# Patient Record
Sex: MALE | Race: WHITE | Employment: FULL TIME | URBAN - NONMETROPOLITAN AREA
[De-identification: names, ages, dates, MRNs, and addresses within clinical notes are randomized per-mention and may not be internally consistent; named-entity substitution may affect disease eponyms.]

---

## 2021-04-15 ENCOUNTER — OFFICE VISIT (OUTPATIENT)
Dept: FAMILY MEDICINE CLINIC | Age: 61
End: 2021-04-15
Payer: COMMERCIAL

## 2021-04-15 VITALS
HEART RATE: 65 BPM | SYSTOLIC BLOOD PRESSURE: 144 MMHG | DIASTOLIC BLOOD PRESSURE: 90 MMHG | WEIGHT: 181 LBS | OXYGEN SATURATION: 97 % | BODY MASS INDEX: 27.43 KG/M2 | HEIGHT: 68 IN

## 2021-04-15 DIAGNOSIS — Z76.89 ESTABLISHING CARE WITH NEW DOCTOR, ENCOUNTER FOR: Primary | ICD-10-CM

## 2021-04-15 DIAGNOSIS — I10 ESSENTIAL HYPERTENSION: ICD-10-CM

## 2021-04-15 DIAGNOSIS — R53.83 FATIGUE, UNSPECIFIED TYPE: ICD-10-CM

## 2021-04-15 DIAGNOSIS — N42.9 PROSTATE DISORDER: ICD-10-CM

## 2021-04-15 DIAGNOSIS — E78.5 HYPERLIPIDEMIA, UNSPECIFIED HYPERLIPIDEMIA TYPE: ICD-10-CM

## 2021-04-15 DIAGNOSIS — Z95.2 AORTIC VALVE REPLACED: ICD-10-CM

## 2021-04-15 DIAGNOSIS — L98.9 SKIN LESION: ICD-10-CM

## 2021-04-15 DIAGNOSIS — R01.1 MURMUR: ICD-10-CM

## 2021-04-15 PROCEDURE — 99204 OFFICE O/P NEW MOD 45 MIN: CPT | Performed by: NURSE PRACTITIONER

## 2021-04-15 RX ORDER — ALBUTEROL SULFATE 90 UG/1
2 AEROSOL, METERED RESPIRATORY (INHALATION) EVERY 6 HOURS PRN
COMMUNITY

## 2021-04-15 RX ORDER — LOSARTAN POTASSIUM 50 MG/1
1 TABLET ORAL 2 TIMES DAILY
COMMUNITY
Start: 2021-03-24 | End: 2022-05-17

## 2021-04-15 RX ORDER — METHYLPREDNISOLONE 4 MG/1
4 TABLET ORAL SEE ADMIN INSTRUCTIONS
COMMUNITY
End: 2021-07-14 | Stop reason: ALTCHOICE

## 2021-04-15 RX ORDER — EVOLOCUMAB 140 MG/ML
INJECTION, SOLUTION SUBCUTANEOUS
COMMUNITY

## 2021-04-15 ASSESSMENT — ENCOUNTER SYMPTOMS
BACK PAIN: 0
BLOOD IN STOOL: 0
ABDOMINAL PAIN: 0
COLOR CHANGE: 1
EYE ITCHING: 0
PHOTOPHOBIA: 0
EYE PAIN: 0
SHORTNESS OF BREATH: 0
COUGH: 0
VOMITING: 0
CONSTIPATION: 0
EYE DISCHARGE: 0
EYE REDNESS: 0
RHINORRHEA: 0
BLURRED VISION: 0
SINUS PAIN: 0
NAUSEA: 0
SORE THROAT: 0
TROUBLE SWALLOWING: 0
CHOKING: 0
DIARRHEA: 0
WHEEZING: 0
CHEST TIGHTNESS: 0
VOICE CHANGE: 0
STRIDOR: 0
SINUS PRESSURE: 0

## 2021-04-15 ASSESSMENT — PATIENT HEALTH QUESTIONNAIRE - PHQ9
SUM OF ALL RESPONSES TO PHQ QUESTIONS 1-9: 0
2. FEELING DOWN, DEPRESSED OR HOPELESS: 0

## 2021-04-15 NOTE — PROGRESS NOTES
Patient ID: Yolanda Mackey is a 63 y.o. male who presents today for a Physical Exam.    BP (!) 144/90   Pulse 65   Ht 5' 8\" (1.727 m)   Wt 181 lb (82.1 kg)   SpO2 97%   BMI 27.52 kg/m²     This patient is new to the practice and is here to establish care. The patients prior PCP was Alec Littlejohn NP. We reviewed the patients allergies and current medications. We reviewed the patients medical, surgical and family history. Hypertension  This is a chronic problem. The current episode started more than 1 year ago. The problem is uncontrolled. Pertinent negatives include no anxiety, blurred vision, chest pain, headaches, neck pain, palpitations or shortness of breath. There are no associated agents to hypertension. Risk factors for coronary artery disease include dyslipidemia and male gender. Past treatments include alpha 1 blockers. The current treatment provides moderate improvement. There are no compliance problems. We reviewed his christie that tracks his BP and it has been running 110-130 range. Last Echo was in October. He saw Dr. Michaela Elam in February. Advised to follow up with him. HLD: Cant take Statins. He has given up red meat and tries to control with diet. Will get labs in office to check. Asthma: He is on albuterol (Proair). He stopped Flovent and thought was contributing to HTN. No current issues at this time. Acute issues:    Rash: Got into something over the weekend and swelled up. He got prednisone shot and pack. This is helping and almost resolved. Muscle weakness: States he is still having muscle pain and weakness. Worse at night. He states he feels like he does not have the strength to get up. He likes to exercise but is having a hard time. This has been goind on awhile reintroduced chicken and fish to his diet to see fi this helps. Will get labs in near future due to him not fasting. He tried Mobic but felt terrible.     Prostate: States he has had prostate pain and urinary issues. He is having a hard time starting flow. When he has prostate issues he gets more fatigue. Advised to follow up with urology. Skin Lesion: He has a sore on his nose that wont heal. He also has a skin lesion on left eye that is changing in color and shape.  Will give Derm referral.     Specialists:     Cardiology: Dr. Michaela Elam    Urology: Dr. Antoinette Napoles: Dr. Raj Carson    Past Medical History:   Diagnosis Date    Aortic regurgitation     Aortic valve replaced     Asthma     Hyperlipidemia     Hypertension     Murmur        Past Surgical History:   Procedure Laterality Date    AORTIC VALVE REPLACEMENT  6/17/2014    CYSTOSCOPY      KIDNEY STONE REMOVAL      NASAL SEPTUM SURGERY      POLYPECTOMY      colon polyp    POLYPECTOMY  2011    colon polyp    SHOULDER SURGERY Right        Family History   Problem Relation Age of Onset    Cancer Mother         breast    Heart Failure Father        Social History     Socioeconomic History    Marital status:      Spouse name: None    Number of children: None    Years of education: None    Highest education level: None   Occupational History    None   Social Needs    Financial resource strain: None    Food insecurity     Worry: None     Inability: None    Transportation needs     Medical: None     Non-medical: None   Tobacco Use    Smoking status: Never Smoker    Smokeless tobacco: Never Used   Substance and Sexual Activity    Alcohol use: No    Drug use: No    Sexual activity: None   Lifestyle    Physical activity     Days per week: None     Minutes per session: None    Stress: None   Relationships    Social connections     Talks on phone: None     Gets together: None     Attends [employer] service: None     Active member of club or organization: None     Attends meetings of clubs or organizations: None     Relationship status: None    Intimate partner violence     Fear of current or ex partner: None     Emotionally abused: None Physically abused: None     Forced sexual activity: None   Other Topics Concern    None   Social History Narrative    None       Allergies   Allergen Reactions    Sulfa Antibiotics Anaphylaxis    Amlodipine Other (See Comments)     headache    Atorvastatin Other (See Comments)     Severe muscle weakness and muscular pain (when on in the past per pt 7/24/19)    Ciprofloxacin Other (See Comments)     torn ligament     Meloxicam Other (See Comments)     \" felt like he had the flu\"    Metoprolol Other (See Comments)     fatigue       Current Outpatient Medications   Medication Sig Dispense Refill    losartan (COZAAR) 50 MG tablet 1 tablet 2 times daily      Evolocumab (REPATHA) 140 MG/ML SOSY Inject into the skin every 14 days       albuterol sulfate HFA (PROAIR HFA) 108 (90 Base) MCG/ACT inhaler Inhale 2 puffs into the lungs every 6 hours as needed for Wheezing      methylPREDNISolone (MEDROL, OREN,) 4 MG tablet Take 4 mg by mouth See Admin Instructions Take by mouth.  montelukast (SINGULAIR) 10 MG tablet TAKE 1 TABLET BY MOUTH EVERY DAY 30 tablet 5    aspirin 81 MG chewable tablet Take 81 mg by mouth daily.  Multiple Vitamins-Minerals (MULTIVITAL-M) TABS Take 1 tablet by mouth daily       No current facility-administered medications for this visit. The patient's past medical history, past surgical history, family history, medications, and allergies were all reviewed and updated at appointment today. Review of Systems   Constitutional: Positive for fatigue. Negative for activity change, appetite change, chills, diaphoresis, fever and unexpected weight change. HENT: Negative for congestion, ear discharge, ear pain, hearing loss, nosebleeds, postnasal drip, rhinorrhea, sinus pressure, sinus pain, sneezing, sore throat, tinnitus, trouble swallowing and voice change. Eyes: Negative for blurred vision, photophobia, pain, discharge, redness and itching.    Respiratory: Negative for cough, choking, chest tightness, shortness of breath, wheezing and stridor. Cardiovascular: Negative for chest pain, palpitations and leg swelling. Gastrointestinal: Negative for abdominal pain, blood in stool, constipation, diarrhea, nausea and vomiting. Endocrine: Negative for cold intolerance, heat intolerance, polydipsia and polyuria. Genitourinary: Negative for difficulty urinating, dysuria, enuresis, flank pain, frequency, hematuria and urgency. Musculoskeletal: Negative for back pain, gait problem, joint swelling, neck pain and neck stiffness. Skin: Positive for color change. Negative for pallor, rash and wound. Allergic/Immunologic: Negative for environmental allergies and food allergies. Neurological: Negative for dizziness, tremors, syncope, speech difficulty, weakness, light-headedness, numbness and headaches. Hematological: Negative for adenopathy. Does not bruise/bleed easily. Psychiatric/Behavioral: Negative for agitation, behavioral problems, confusion, decreased concentration, dysphoric mood, hallucinations, self-injury, sleep disturbance and suicidal ideas. The patient is not nervous/anxious and is not hyperactive. Physical Exam  Vitals signs reviewed. Constitutional:       General: He is not in acute distress. Appearance: Normal appearance. He is well-developed. HENT:      Head: Normocephalic and atraumatic. Right Ear: Hearing and external ear normal.      Left Ear: Hearing and external ear normal.      Nose: Nose normal.      Right Sinus: No maxillary sinus tenderness or frontal sinus tenderness. Left Sinus: No maxillary sinus tenderness or frontal sinus tenderness. Mouth/Throat:      Pharynx: No oropharyngeal exudate. Eyes:      General:         Right eye: No discharge. Left eye: No discharge. Conjunctiva/sclera: Conjunctivae normal.      Pupils: Pupils are equal, round, and reactive to light.    Neck:      Musculoskeletal: Normal range of motion. Thyroid: No thyromegaly. Vascular: No JVD. Trachea: No tracheal deviation. Cardiovascular:      Rate and Rhythm: Normal rate and regular rhythm. Heart sounds: Murmur present. No friction rub. Pulmonary:      Effort: Pulmonary effort is normal. No respiratory distress. Breath sounds: Normal breath sounds. No stridor. No decreased breath sounds, wheezing, rhonchi or rales. Chest:      Chest wall: No tenderness. Abdominal:      Palpations: There is no mass. Musculoskeletal: Normal range of motion. General: No tenderness. Lymphadenopathy:      Cervical: No cervical adenopathy. Skin:     General: Skin is warm and dry. Capillary Refill: Capillary refill takes less than 2 seconds. Findings: Lesion present. No rash. Neurological:      Mental Status: He is alert and oriented to person, place, and time. Sensory: Sensation is intact. Motor: Motor function is intact. Coordination: Coordination normal.   Psychiatric:         Attention and Perception: Attention and perception normal.         Mood and Affect: Mood normal.         Speech: Speech normal.         Behavior: Behavior normal. Behavior is cooperative. Thought Content: Thought content normal.         Cognition and Memory: Cognition normal.         Judgment: Judgment normal.       Assessment:  Encounter Diagnoses   Name Primary?  Establishing care with new doctor, encounter for Yes    Hyperlipidemia, unspecified hyperlipidemia type     Essential hypertension     Fatigue, unspecified type     Murmur     Aortic valve replaced     Prostate disorder     Skin lesion        Plan:  1. Establishing care with new doctor, encounter for    2. Hyperlipidemia, unspecified hyperlipidemia type    - Lipid Panel; Future    3. Essential hypertension    - CBC Auto Differential; Future  - Comprehensive Metabolic Panel; Future  - Lipid Panel;  Future  - TSH without Reflex; Future  - T4, Free; Future  - Vitamin B12; Future  - Vitamin D 25 Hydroxy; Future    4. Fatigue, unspecified type    - CBC Auto Differential; Future  - Comprehensive Metabolic Panel; Future  - Lipid Panel; Future  - TSH without Reflex; Future  - T4, Free; Future  - Vitamin B12; Future  - Vitamin D 25 Hydroxy; Future    5. Murmur    - Advised to follow up with his cardiologist. Last Cardiology note states no murmur. 6. Aortic valve replaced    - Will follow up with cardiologist.     7. Prostate disorder    - PSA, Prostatic Specific Antigen; Future  -Will follow up with urology. 8. Skin lesion    - [facility] [address] [place] [address]    Will follow up with Dr. Jillian Watkins about murmur. Will follow up with lab results. Follow up if symptoms do not improve or worsen. If the patient becomes short of breath go straight to the ER or call 911. Return in about 3 months (around 7/15/2021).

## 2021-04-19 DIAGNOSIS — R73.09 ELEVATED GLUCOSE: Primary | ICD-10-CM

## 2021-04-23 ENCOUNTER — NURSE ONLY (OUTPATIENT)
Dept: FAMILY MEDICINE CLINIC | Age: 61
End: 2021-04-23
Payer: COMMERCIAL

## 2021-04-23 DIAGNOSIS — N42.9 PROSTATE DISORDER: ICD-10-CM

## 2021-04-23 DIAGNOSIS — R73.09 ELEVATED GLUCOSE: ICD-10-CM

## 2021-04-23 DIAGNOSIS — E78.5 HYPERLIPIDEMIA, UNSPECIFIED HYPERLIPIDEMIA TYPE: ICD-10-CM

## 2021-04-23 DIAGNOSIS — R53.83 FATIGUE, UNSPECIFIED TYPE: ICD-10-CM

## 2021-04-23 DIAGNOSIS — I10 ESSENTIAL HYPERTENSION: ICD-10-CM

## 2021-04-23 LAB
A/G RATIO: 1.7 (ref 1.1–2.2)
ALBUMIN SERPL-MCNC: 4.3 G/DL (ref 3.4–5)
ALP BLD-CCNC: 73 U/L (ref 40–129)
ALT SERPL-CCNC: 27 U/L (ref 10–40)
ANION GAP SERPL CALCULATED.3IONS-SCNC: 12 MMOL/L (ref 3–16)
AST SERPL-CCNC: 13 U/L (ref 15–37)
BASOPHILS ABSOLUTE: 0.1 K/UL (ref 0–0.2)
BASOPHILS RELATIVE PERCENT: 0.6 %
BILIRUB SERPL-MCNC: 0.8 MG/DL (ref 0–1)
BUN BLDV-MCNC: 14 MG/DL (ref 7–20)
CALCIUM SERPL-MCNC: 9 MG/DL (ref 8.3–10.6)
CHLORIDE BLD-SCNC: 101 MMOL/L (ref 99–110)
CHOLESTEROL, TOTAL: 107 MG/DL (ref 0–199)
CO2: 30 MMOL/L (ref 21–32)
CREAT SERPL-MCNC: 1 MG/DL (ref 0.8–1.3)
EOSINOPHILS ABSOLUTE: 0.1 K/UL (ref 0–0.6)
EOSINOPHILS RELATIVE PERCENT: 1.4 %
GFR AFRICAN AMERICAN: >60
GFR NON-AFRICAN AMERICAN: >60
GLOBULIN: 2.6 G/DL
GLUCOSE BLD-MCNC: 85 MG/DL (ref 70–99)
HCT VFR BLD CALC: 49.6 % (ref 40.5–52.5)
HDLC SERPL-MCNC: 41 MG/DL (ref 40–60)
HEMOGLOBIN: 17.2 G/DL (ref 13.5–17.5)
LDL CHOLESTEROL CALCULATED: ABNORMAL MG/DL
LDL CHOLESTEROL DIRECT: 28 MG/DL
LYMPHOCYTES ABSOLUTE: 3.1 K/UL (ref 1–5.1)
LYMPHOCYTES RELATIVE PERCENT: 32.2 %
MCH RBC QN AUTO: 32.4 PG (ref 26–34)
MCHC RBC AUTO-ENTMCNC: 34.6 G/DL (ref 31–36)
MCV RBC AUTO: 93.8 FL (ref 80–100)
MONOCYTES ABSOLUTE: 0.7 K/UL (ref 0–1.3)
MONOCYTES RELATIVE PERCENT: 7.4 %
NEUTROPHILS ABSOLUTE: 5.6 K/UL (ref 1.7–7.7)
NEUTROPHILS RELATIVE PERCENT: 58.4 %
PDW BLD-RTO: 12.8 % (ref 12.4–15.4)
PLATELET # BLD: 229 K/UL (ref 135–450)
PMV BLD AUTO: 8.5 FL (ref 5–10.5)
POTASSIUM SERPL-SCNC: 4.2 MMOL/L (ref 3.5–5.1)
PROSTATE SPECIFIC ANTIGEN: 1.52 NG/ML (ref 0–4)
RBC # BLD: 5.29 M/UL (ref 4.2–5.9)
SODIUM BLD-SCNC: 143 MMOL/L (ref 136–145)
T4 FREE: 1.3 NG/DL (ref 0.9–1.8)
TOTAL PROTEIN: 6.9 G/DL (ref 6.4–8.2)
TRIGL SERPL-MCNC: 316 MG/DL (ref 0–150)
TSH SERPL DL<=0.05 MIU/L-ACNC: 3.63 UIU/ML (ref 0.27–4.2)
VITAMIN B-12: 655 PG/ML (ref 211–911)
VITAMIN D 25-HYDROXY: 32.3 NG/ML
VLDLC SERPL CALC-MCNC: ABNORMAL MG/DL
WBC # BLD: 9.6 K/UL (ref 4–11)

## 2021-04-23 PROCEDURE — 36415 COLL VENOUS BLD VENIPUNCTURE: CPT | Performed by: NURSE PRACTITIONER

## 2021-04-23 NOTE — PROGRESS NOTES
Blood drawn per order. Needle size: 23 g  Site: L Antecubital.  First attempt successful Yes    Pressure applied until bleeding stopped. Band-aid applied. Patient informed to call office or return if bleeding reoccurs and unable to stop.     Tubes drawn: 1 purple     2 red

## 2021-04-24 LAB
ESTIMATED AVERAGE GLUCOSE: 111.2 MG/DL
HBA1C MFR BLD: 5.5 %

## 2021-04-26 DIAGNOSIS — R79.89 LOW VITAMIN D LEVEL: Primary | ICD-10-CM

## 2021-05-03 ENCOUNTER — PATIENT MESSAGE (OUTPATIENT)
Dept: FAMILY MEDICINE CLINIC | Age: 61
End: 2021-05-03

## 2021-05-03 RX ORDER — MONTELUKAST SODIUM 10 MG/1
TABLET ORAL
Qty: 30 TABLET | Refills: 11 | Status: SHIPPED | OUTPATIENT
Start: 2021-05-03 | End: 2022-05-17 | Stop reason: SDUPTHER

## 2021-07-14 ENCOUNTER — OFFICE VISIT (OUTPATIENT)
Dept: FAMILY MEDICINE CLINIC | Age: 61
End: 2021-07-14
Payer: COMMERCIAL

## 2021-07-14 VITALS
WEIGHT: 185 LBS | OXYGEN SATURATION: 98 % | BODY MASS INDEX: 28.13 KG/M2 | DIASTOLIC BLOOD PRESSURE: 78 MMHG | HEART RATE: 74 BPM | SYSTOLIC BLOOD PRESSURE: 132 MMHG

## 2021-07-14 DIAGNOSIS — J45.909 UNCOMPLICATED ASTHMA, UNSPECIFIED ASTHMA SEVERITY, UNSPECIFIED WHETHER PERSISTENT: ICD-10-CM

## 2021-07-14 DIAGNOSIS — I10 ESSENTIAL HYPERTENSION: ICD-10-CM

## 2021-07-14 DIAGNOSIS — E78.5 HYPERLIPIDEMIA, UNSPECIFIED HYPERLIPIDEMIA TYPE: Primary | ICD-10-CM

## 2021-07-14 DIAGNOSIS — R01.1 MURMUR: ICD-10-CM

## 2021-07-14 DIAGNOSIS — H91.93 DECREASED HEARING OF BOTH EARS: ICD-10-CM

## 2021-07-14 LAB
CHOLESTEROL, TOTAL: 126 MG/DL (ref 0–199)
HDLC SERPL-MCNC: 40 MG/DL (ref 40–60)
LDL CHOLESTEROL CALCULATED: 47 MG/DL
TRIGL SERPL-MCNC: 197 MG/DL (ref 0–150)
VLDLC SERPL CALC-MCNC: 39 MG/DL

## 2021-07-14 PROCEDURE — 99214 OFFICE O/P EST MOD 30 MIN: CPT | Performed by: NURSE PRACTITIONER

## 2021-07-14 RX ORDER — FLUTICASONE PROPIONATE 220 UG/1
AEROSOL, METERED RESPIRATORY (INHALATION)
COMMUNITY
End: 2021-07-14 | Stop reason: SDUPTHER

## 2021-07-14 RX ORDER — FLUTICASONE PROPIONATE 220 UG/1
AEROSOL, METERED RESPIRATORY (INHALATION)
Qty: 1 INHALER | Refills: 2 | Status: SHIPPED | OUTPATIENT
Start: 2021-07-14 | End: 2022-05-11

## 2021-07-14 ASSESSMENT — ENCOUNTER SYMPTOMS
COUGH: 0
COLOR CHANGE: 0
SINUS PRESSURE: 0
EYE PAIN: 0
VOMITING: 0
RHINORRHEA: 0
STRIDOR: 0
SORE THROAT: 0
WHEEZING: 0
VOICE CHANGE: 0
CHEST TIGHTNESS: 0
CHOKING: 0
BACK PAIN: 0
PHOTOPHOBIA: 0
EYE DISCHARGE: 0
SINUS PAIN: 0
CONSTIPATION: 0
TROUBLE SWALLOWING: 0
SHORTNESS OF BREATH: 0
BLOOD IN STOOL: 0
NAUSEA: 0
EYE REDNESS: 0
ABDOMINAL PAIN: 0
EYE ITCHING: 0
DIARRHEA: 0

## 2021-07-14 NOTE — PROGRESS NOTES
Chief Complaint   Patient presents with   [facility] Urinary Frequency       HPI:  Diana Corbett is a 63 y.o. (: 1960) here today   for   HPI    Patient's medications, allergies, past medical, surgical, social and family histories were reviewed and updated as appropriate. Chronic Issues:    HTN: He is on losartan 50 mg. Well controlled in office. Patient is doing well on current medical regimen and will continue current medical regimen at this time. HLD: He is trying to regulate with diet. Will get labs today in office. He tried red yeast rice but states he had muscle pain. Asthma: He is on albuterol. He needs refills today. Patient is doing well on current medical regimen and will continue current medical regimen at this time. Hearing decreased: He is having more difficulty hearing. Would like an Audiology referral.     Acute Issues:    Urinary issues:  He has had prostate problems for years. He has had 2 prostate surgeries in the past. He saw Dr. Bush. He has pain near his rectum/prostate area. He has had a hard time starting the flow of urine and then stops midstream. Flomax gives him cold symptoms per patient. He last saw urology was in January. Advised to call urology and follow up with him. Denies burning with urination. ROS:  Review of Systems   Constitutional: Negative for activity change, appetite change, chills, diaphoresis, fatigue, fever and unexpected weight change. HENT: Negative for congestion, ear discharge, ear pain, hearing loss, nosebleeds, postnasal drip, rhinorrhea, sinus pressure, sinus pain, sneezing, sore throat, tinnitus, trouble swallowing and voice change. Eyes: Negative for photophobia, pain, discharge, redness and itching. Respiratory: Negative for cough, choking, chest tightness, shortness of breath, wheezing and stridor. Cardiovascular: Negative for chest pain, palpitations and leg swelling.    Gastrointestinal: Negative for abdominal pain, blood in stool, constipation, diarrhea, nausea and vomiting. Endocrine: Negative for cold intolerance, heat intolerance, polydipsia and polyuria. Genitourinary: Positive for difficulty urinating. Negative for dysuria, enuresis, flank pain, frequency, hematuria and urgency. Difficulty starting flow of urine     Musculoskeletal: Negative for back pain, gait problem, joint swelling, neck pain and neck stiffness. Skin: Negative for color change, pallor, rash and wound. Allergic/Immunologic: Negative for environmental allergies and food allergies. Neurological: Negative for dizziness, tremors, syncope, speech difficulty, weakness, light-headedness, numbness and headaches. Hematological: Negative for adenopathy. Does not bruise/bleed easily. Psychiatric/Behavioral: Negative for agitation, behavioral problems, confusion, decreased concentration, dysphoric mood, hallucinations, self-injury, sleep disturbance and suicidal ideas. The patient is not nervous/anxious and is not hyperactive.         Hemoglobin A1C (%)   Date Value   04/23/2021 5.5     LDL Calculated (mg/dL)   Date Value   04/23/2021 see below       Past Medical History:   Diagnosis Date    Aortic regurgitation     Aortic valve replaced     Asthma     Hyperlipidemia     Hypertension     Murmur        Family History   Problem Relation Age of Onset    Cancer Mother         breast    Heart Failure Father        Social History     Socioeconomic History    Marital status:      Spouse name: Not on file    Number of children: Not on file    Years of education: Not on file    Highest education level: Not on file   Occupational History    Not on file   Tobacco Use    Smoking status: Never Smoker    Smokeless tobacco: Never Used   Substance and Sexual Activity    Alcohol use: No    Drug use: No    Sexual activity: Not on file   Other Topics Concern    Not on file   Social History Narrative    Not on file     Social Determinants of Health     Financial Resource Strain:     Difficulty of Paying Living Expenses:    Food Insecurity:     Worried About [address] in the Last Year:     [address] in the Last Year:    Transportation Needs:     Lack of Transportation (Medical):  Lack of Transportation (Non-Medical):    Physical Activity:     Days of Exercise per Week:     Minutes of Exercise per Session:    Stress:     Feeling of Stress :    Social Connections:     Frequency of Communication with Friends and Family:     Frequency of Social Gatherings with Friends and Family:     Attends [employer] Services:     Active Member of Clubs or Organizations:     Attends Club or Organization Meetings:     Marital Status:    Intimate Partner Violence:     Fear of Current or Ex-Partner:     Emotionally Abused:     Physically Abused:     Sexually Abused:        Prior to Visit Medications    Medication Sig Taking? Authorizing Provider   magnesium hydroxide (MILK OF MAGNESIA) 400 MG/5ML suspension Take by mouth daily as needed for Constipation Yes Historical Provider, MD   fluticasone (FLOVENT HFA) 220 MCG/ACT inhaler Flovent  mcg/actuation aerosol inhaler Yes LEIGHTON Robert CNP   montelukast (SINGULAIR) 10 MG tablet TAKE 1 TABLET BY MOUTH EVERY DAY Yes LEIGHTON Robert CNP   losartan (COZAAR) 50 MG tablet 1 tablet 2 times daily Yes Historical Provider, MD   Evolocumab (REPATHA) 140 MG/ML SOSY Inject into the skin every 14 days  Yes Historical Provider, MD   albuterol sulfate HFA (PROAIR HFA) 108 (90 Base) MCG/ACT inhaler Inhale 2 puffs into the lungs every 6 hours as needed for Wheezing Yes Historical Provider, MD   Multiple Vitamins-Minerals (MULTIVITAL-M) TABS Take 1 tablet by mouth daily Yes Historical Provider, MD   aspirin 81 MG chewable tablet Take 81 mg by mouth daily.  Yes Historical Provider, MD       Allergies   Allergen Reactions    Sulfa Antibiotics Anaphylaxis    Amlodipine Other (See Comments) headache    Atorvastatin Other (See Comments)     Severe muscle weakness and muscular pain (when on in the past per pt 7/24/19)    Ciprofloxacin Other (See Comments)     torn ligament     Meloxicam Other (See Comments)     \" felt like he had the flu\"    Metoprolol Other (See Comments)     fatigue       OBJECTIVE:    /78   Pulse 74   Wt 185 lb (83.9 kg)   SpO2 98%   BMI 28.13 kg/m²     BP Readings from Last 2 Encounters:   07/14/21 132/78   04/15/21 (!) 144/90       Wt Readings from Last 3 Encounters:   07/14/21 185 lb (83.9 kg)   04/15/21 181 lb (82.1 kg)   09/10/15 166 lb (75.3 kg)       Physical Exam  Vitals reviewed. Constitutional:       General: He is not in acute distress. Appearance: Normal appearance. He is well-developed. HENT:      Head: Normocephalic and atraumatic. Right Ear: Hearing and external ear normal.      Left Ear: Hearing and external ear normal.      Nose: Nose normal.      Right Sinus: No maxillary sinus tenderness or frontal sinus tenderness. Left Sinus: No maxillary sinus tenderness or frontal sinus tenderness. Mouth/Throat:      Pharynx: No oropharyngeal exudate. Eyes:      General:         Right eye: No discharge. Left eye: No discharge. Conjunctiva/sclera: Conjunctivae normal.      Pupils: Pupils are equal, round, and reactive to light. Neck:      Thyroid: No thyromegaly. Vascular: No JVD. Trachea: No tracheal deviation. Cardiovascular:      Rate and Rhythm: Normal rate and regular rhythm. Heart sounds: Murmur heard. No friction rub. Pulmonary:      Effort: Pulmonary effort is normal. No respiratory distress. Breath sounds: Normal breath sounds. No stridor. No decreased breath sounds, wheezing, rhonchi or rales. Musculoskeletal:         General: No tenderness. Normal range of motion. Cervical back: Normal range of motion. Lymphadenopathy:      Cervical: No cervical adenopathy.    Skin:     General: Skin is warm and dry. Capillary Refill: Capillary refill takes less than 2 seconds. Findings: No rash. Neurological:      Mental Status: He is alert and oriented to person, place, and time. Sensory: Sensation is intact. Motor: Motor function is intact. Coordination: Coordination normal.   Psychiatric:         Attention and Perception: Attention and perception normal.         Mood and Affect: Mood normal.         Speech: Speech normal.         Behavior: Behavior normal. Behavior is cooperative. Thought Content: Thought content normal.         Cognition and Memory: Cognition normal.         Judgment: Judgment normal.       ASSESSMENT/PLAN:    1. Hyperlipidemia, unspecified hyperlipidemia type    - Trying to control with diet, cant tolerate meds. - Lipid Panel    2. Essential hypertension    Patient is doing well on current medical regimen and will continue current medical regimen at this time. 3. Murmur    - Stable at this time. See's cardiology. 4. Uncomplicated asthma, unspecified asthma severity, unspecified whether persistent    - fluticasone (FLOVENT HFA) 220 MCG/ACT inhaler; Flovent  mcg/actuation aerosol inhaler  Dispense: 1 Inhaler; Refill: 2    5. Decreased hearing of both ears    - Sharita Vallecillo Au.D., [facility], Lupis Tavares    Will have him follow up in 6 months. Doing well on current medical regimen. Return in about 6 months (around 1/14/2022).

## 2021-08-17 NOTE — PROGRESS NOTES
Sheyla Hendrix   1960, 57 y.o. male   [id number]       Referring Provider: Vince Granados MD  Referral Type: In an order in [address] [place] [address]    Reason for Visit: Evaluation of suspected change in hearing and tinnitus    ADULT AUDIOLOGIC EVALUATION      Sheyla Hendrix is a 57 y.o. male seen today, 8/18/2021 , for an initial audiologic evaluation. Patient was seen by Vince Granados MD following today's evaluation. AUDIOLOGIC AND OTHER PERTINENT MEDICAL HISTORY:      Sheyla Hendrix noted a perceived gradual decline in hearing and long-standing history of tinnitus that has reportedly worsened recently, bilaterally. Patient also reports family history of hearing loss in father. No additional significant otologic or medical history was reported. Sheyla Hendrix denied otalgia, aural fullness, otorrhea, dizziness, imbalance, history of falls, history of occupational/recreationao noise exposure, history of head trauma and history of ear surgery. Date: 8/18/2021     IMPRESSIONS:      Today's results revealed a symmetric sensorineural hearing loss with excellent word recognition, bilaterally. Hearing loss significant enough to create hearing difficulty in at least some listening situations. Discussed benefits of amplification. Follow medical recommendations of Vince Granados MD. Patient is scheduled for hearing aid evaluation on 9/1/21. ASSESSMENT AND FINDINGS:     Otoscopy revealed: Clear ear canals bilaterally    RIGHT EAR:  Hearing Sensitivity: Within normal limits through 1000Hz sloping to a mild to severe sensorineural hearing loss through 8kHz. Speech Recognition Threshold: 25 dB HL  Word Recognition: Excellent (96%), based on NU-6 25-word list at 75 dBHL using recorded speech stimuli. Tympanometry: Normal peak pressure and compliance, Type A tympanogram, consistent with normal middle ear function.     LEFT EAR:  Hearing Sensitivity: Within normal limits through 500Hz sloping to a mild to severe sensorineural hearing loss through ALL [facility]. Speech Recognition Threshold: 25 dB HL  Word Recognition: Excellent (96%), based on NU-6 25-word list at 75 dBHL using recorded speech stimuli. Tympanometry: Normal peak pressure and compliance, Type A tympanogram, consistent with normal middle ear function. Reliability: Good  Transducer: Inserts    See scanned audiogram dated 8/18/2021  for results. PATIENT EDUCATION:       The following items were discussed with the patient:    - Good Communication Strategies   - Hearing Loss and Hearing Aids     - Tinnitus Management Strategies    Educational information was shared in the After Visit Summary. RECOMMENDATIONS:                                                                                                                                                                                                                                                            The following items are recommended based on patient report and results from today's appointment:   - Continue medical follow-up with Tracy Mendes MD.   - Retest hearing as medically indicated and/or sooner if a change in hearing is noted. - If desired, schedule a Hearing Aid Evaluation (HAE) appointment to discuss hearing aid options    - Utilize \"Good Communication Strategies\" as discussed to assist in speech understanding with communication partners. - Maintain a sound enriched environment to assist in the management of tinnitus symptoms.          Rocío Burris  Audiologist    Chart CC'd to: Tracy Mendes MD      Degree of   Hearing Sensitivity dB Range   Within Normal Limits (WNL) 0 - 20   Mild 20 - 40   Moderate 40 - 55   Moderately-Severe 55 - 70   Severe 70 - 90   Profound 90 +

## 2021-08-18 ENCOUNTER — PROCEDURE VISIT (OUTPATIENT)
Dept: AUDIOLOGY | Age: 61
End: 2021-08-18
Payer: COMMERCIAL

## 2021-08-18 ENCOUNTER — OFFICE VISIT (OUTPATIENT)
Dept: ENT CLINIC | Age: 61
End: 2021-08-18
Payer: COMMERCIAL

## 2021-08-18 VITALS
SYSTOLIC BLOOD PRESSURE: 137 MMHG | TEMPERATURE: 97.2 F | DIASTOLIC BLOOD PRESSURE: 84 MMHG | HEART RATE: 66 BPM | WEIGHT: 185 LBS | BODY MASS INDEX: 28.04 KG/M2 | HEIGHT: 68 IN

## 2021-08-18 DIAGNOSIS — H90.3 SENSORINEURAL HEARING LOSS, BILATERAL: Primary | ICD-10-CM

## 2021-08-18 DIAGNOSIS — H93.13 TINNITUS OF BOTH EARS: ICD-10-CM

## 2021-08-18 DIAGNOSIS — H93.13 TINNITUS, BILATERAL: ICD-10-CM

## 2021-08-18 DIAGNOSIS — R42 DIZZINESS: ICD-10-CM

## 2021-08-18 DIAGNOSIS — H91.90 HEARING LOSS, UNSPECIFIED HEARING LOSS TYPE, UNSPECIFIED LATERALITY: Primary | ICD-10-CM

## 2021-08-18 PROCEDURE — 99203 OFFICE O/P NEW LOW 30 MIN: CPT | Performed by: OTOLARYNGOLOGY

## 2021-08-18 PROCEDURE — 92557 COMPREHENSIVE HEARING TEST: CPT | Performed by: AUDIOLOGIST

## 2021-08-18 PROCEDURE — 92567 TYMPANOMETRY: CPT | Performed by: AUDIOLOGIST

## 2021-08-18 NOTE — PATIENT INSTRUCTIONS

## 2021-08-18 NOTE — Clinical Note
Dr. Mao Cruz,    Thank you for your referral for audiometric testing on this patient. Please see the scanned audiogram (under \"Media\" tab) and encounter note for details. If you have any questions, or if there is anything else you need, please let me know.       Rocío Self  Audiologist  ---  [address] [facility] - Audiology

## 2021-08-18 NOTE — PROGRESS NOTES
[address] [facility] SURGERY  NEW PATIENT HISTORY AND PHYSICAL NOTE      Patient Name: [address] [facility] Record Number:  [id number]  Primary Care Physician:  LEIGHTON Hutson CNP    ChiefComplaint     Chief Complaint   Patient presents with    Hearing Problem     Has also been going on for 8 years and has gotten worse.  Tinnitus     Been going on for about 8 ears and has gotten worse over time.  Dizziness     Does have occasional dizziness. Has gotten worse in the last year. Happens at least once every 2 weeks (about 12 times in the last year). Dizzy spells last one minute at the most.      History of Present Illness     Elizabeth Bello is an 57 y.o. male with concern for bilateral hearing loss, tinnitus (constant, high-pitched, nonpulsatile, left greater than sign right) that of been ongoing for the past 7-8 years. Denies any episodes of sudden hearing loss, otalgia, otorrhea, ear fullness, yulia vertigo. In addition to the above, he states occasional dizziness that occurs every 2-3 weeks, usually while stationary. During this time he feels tired, as if he is going to faint, with lightheadednessdenies yulia vertigo. Has history of cardiovascular disease, has discussed symptoms with his cardiologist is undergoing a stress test this coming Friday.     Past Medical History     Past Medical History:   Diagnosis Date    Aortic regurgitation     Aortic valve replaced     Asthma     Hyperlipidemia     Hypertension     Murmur      Past Surgical History     Past Surgical History:   Procedure Laterality Date    AORTIC VALVE REPLACEMENT  6/17/2014    CYSTOSCOPY      KIDNEY STONE REMOVAL      NASAL SEPTUM SURGERY      POLYPECTOMY      colon polyp    POLYPECTOMY  2011    colon polyp    SHOULDER SURGERY Right        Family History     Family History   Problem Relation Age of Onset    Cancer Mother         breast    Heart Failure Father     Hearing Loss Father Social History     Social History     Tobacco Use    Smoking status: Never Smoker    Smokeless tobacco: Never Used   Substance Use Topics    Alcohol use: Yes     Comment: occasional    Drug use: No        Allergies     Allergies   Allergen Reactions    Sulfa Antibiotics Anaphylaxis    Amlodipine Other (See Comments)     headache    Atorvastatin Other (See Comments)     Severe muscle weakness and muscular pain (when on in the past per pt 7/24/19)    Ciprofloxacin Other (See Comments)     torn ligament     Meloxicam Other (See Comments)     \" felt like he had the flu\"    Metoprolol Other (See Comments)     fatigue       Medications     Current Outpatient Medications   Medication Sig Dispense Refill    fluticasone (FLOVENT HFA) 220 MCG/ACT inhaler Flovent  mcg/actuation aerosol inhaler 1 Inhaler 2    montelukast (SINGULAIR) 10 MG tablet TAKE 1 TABLET BY MOUTH EVERY DAY 30 tablet 11    losartan (COZAAR) 50 MG tablet 1 tablet 2 times daily      Evolocumab (REPATHA) 140 MG/ML SOSY Inject into the skin every 14 days       albuterol sulfate HFA (PROAIR HFA) 108 (90 Base) MCG/ACT inhaler Inhale 2 puffs into the lungs every 6 hours as needed for Wheezing      Multiple Vitamins-Minerals (MULTIVITAL-M) TABS Take 1 tablet by mouth daily      aspirin 81 MG chewable tablet Take 81 mg by mouth daily.  magnesium hydroxide (MILK OF MAGNESIA) 400 MG/5ML suspension Take by mouth daily as needed for Constipation       No current facility-administered medications for this visit.        Review of Systems     REVIEW OF SYSTEMS  The following systems were reviewed and revealed the following in addition to any already discussed in the HPI:    CONSTITUTIONAL: No weight loss, no fever, no night sweats, no chills  EYES: no vision changes, no blurry vision  EARS: + Changes in hearing, no otalgia  NOSE: no epistaxis, no rhinorrhea  RESPIRATORY: No difficulty breathing, no shortness of breath  CV: no chest pain, no peripheral vascular disease  HEME: No coagulation disorder, no bleeding disorder  NEURO: No TIA or stroke-like symptoms  SKIN: No new rashes in the head and neck, no recent skin cancers  MOUTH: No new ulcers, no recent teeth infections  GASTROINTESTINAL: No diarrhea, stomach pain  PSYCH: No anxiety, no depression    PhysicalExam     Vitals:    08/18/21 0923   BP: 137/84   Site: Left Upper Arm   Position: Sitting   Cuff Size: Medium Adult   Pulse: 66   Temp: 97.2 °F (36.2 °C)   Weight: 185 lb (83.9 kg)   Height: 5' 8\" (1.727 m)       PHYSICAL EXAM  /84 (Site: Left Upper Arm, Position: Sitting, Cuff Size: Medium Adult)   Pulse 66   Temp 97.2 °F (36.2 °C)   Ht 5' 8\" (1.727 m)   Wt 185 lb (83.9 kg)   BMI 28.13 kg/m²     GENERAL: No Acute Distress, Alert and Oriented, no hoarseness  EYES: EOMI, Anti-icteric  NOSE: On anterior rhinoscopy there is no epistaxis, nasal mucosa within normal limits, no purulent drainage  EARS: Normal external appearance; on portable otomicroscopy:  -Right ear: External auditory canal without stenosis, tympanic membrane clear, no middle ear effusions or retractions  -Left ear: External auditory canal without stenosis, tympanic membrane clear, no middle ear effusions or retractions  -Tuning fork testing: With a 512-Hz tuning fork the Herrera is right lateralized, The Rinne on the right ear the is air>bone conduction, on the left ear air>bone conduction  FACE: 1/6 House-Brackmann Scale, symmetric, sensation equal bilaterally  ORAL CAVITY: No masses or lesions palpated, uvula is midline, moist mucous membranes, 0+ tonsils, good dentition  NECK: Normal range of motion, no thyromegaly, trachea is midline, no lymphadenopathy, no neck masses, no crepitus  CHEST: Normal respiratory effort, no retractions, breathing comfortably  SKIN: No rashes, normal appearing skin, no evidence of skin lesions/tumors  NEURO: CN 2, 3, 4, 5, 6, 7, 11, 12 intact bilaterally     Data/Imaging Review      Procedure     None      Assessment and Plan     1. Hearing loss, unspecified hearing loss type, unspecified laterality  Bilateral, downsloping sensorineural hearing lossno significant asymmetries noted, bilateral tinnitus appreciated left greater than sign right. No yulia vertigo, however complains of dizzinessmay be cardiovascular nature, is being worked up for this by his cardiologist.  If yulia vertigo develops, he we have asked him to call the clinic immediately. He otherwise cleared for hearing aids. - Angelo Funez, Audiology, [facility]    2. Tinnitus of both ears  Left > right, no pathology identified on otomicroscopy. 3. Dizziness  No yulia vertigo, may be cardiovascular natureyulia vertigo develops we have asked him to call the clinic back      Return if symptoms worsen or fail to improve. Tatiana Dupree MD  [facility]  Department of Otolaryngology/Head and Neck Surgery  8/18/21    I have performed a head and neck physical exam personally or was physically present during the key or critical portions of the service. Medical Decision Making: The following items were considered in medical decision making:  Independent review of images  Review / order clinical lab tests  Review / order radiology tests  Decision to obtain old records  Review and summation of old records as accessed through [facility] (a summary of my findings in these old records: None)     Portions of this note were dictated using Dragon.  There may be linguistic errors secondary to the use of this program.

## 2021-08-24 ENCOUNTER — OFFICE VISIT (OUTPATIENT)
Dept: FAMILY MEDICINE CLINIC | Age: 61
End: 2021-08-24
Payer: COMMERCIAL

## 2021-08-24 VITALS
OXYGEN SATURATION: 96 % | HEART RATE: 67 BPM | BODY MASS INDEX: 27.98 KG/M2 | SYSTOLIC BLOOD PRESSURE: 120 MMHG | DIASTOLIC BLOOD PRESSURE: 78 MMHG | WEIGHT: 184 LBS

## 2021-08-24 DIAGNOSIS — R01.1 MURMUR: ICD-10-CM

## 2021-08-24 DIAGNOSIS — Z01.818 PRE-OP EXAM: Primary | ICD-10-CM

## 2021-08-24 DIAGNOSIS — N42.9 PROSTATE DISORDER: ICD-10-CM

## 2021-08-24 DIAGNOSIS — R35.0 URINARY FREQUENCY: ICD-10-CM

## 2021-08-24 PROCEDURE — 99214 OFFICE O/P EST MOD 30 MIN: CPT | Performed by: NURSE PRACTITIONER

## 2021-08-24 ASSESSMENT — ENCOUNTER SYMPTOMS
EYE DISCHARGE: 0
SINUS PAIN: 0
COUGH: 0
SHORTNESS OF BREATH: 0
ABDOMINAL PAIN: 0
SORE THROAT: 0
EYE REDNESS: 0
NAUSEA: 0
CONSTIPATION: 0
EYE PAIN: 0
VOMITING: 0
PHOTOPHOBIA: 0
TROUBLE SWALLOWING: 0
VOICE CHANGE: 0
BLOOD IN STOOL: 0
DIARRHEA: 0
SINUS PRESSURE: 0
RHINORRHEA: 0
STRIDOR: 0
BACK PAIN: 0
CHOKING: 0
COLOR CHANGE: 0
WHEEZING: 0
EYE ITCHING: 0
CHEST TIGHTNESS: 0

## 2021-08-24 NOTE — PROGRESS NOTES
Thomas Hickman  YOB: 1960    Date of Service:  8/24/2021      Wt Readings from Last 2 Encounters:   08/24/21 184 lb (83.5 kg)   08/18/21 185 lb (83.9 kg)       BP Readings from Last 3 Encounters:   08/24/21 120/78   08/18/21 137/84   07/14/21 132/78        Chief Complaint   Patient presents with    Pre-op Exam     cystoscopy by Dr Jesus Moreno on 8/26/21 at [facility]        Allergies   Allergen Reactions    Sulfa Antibiotics Anaphylaxis    Amlodipine Other (See Comments)     headache    Atorvastatin Other (See Comments)     Severe muscle weakness and muscular pain (when on in the past per pt 7/24/19)    Ciprofloxacin Other (See Comments)     torn ligament     Meloxicam Other (See Comments)     \" felt like he had the flu\"    Metoprolol Other (See Comments)     fatigue       Outpatient Medications Marked as Taking for the 8/24/21 encounter (Office Visit) with Redia Meigs, APRN - CNP   Medication Sig Dispense Refill    fluticasone (FLOVENT HFA) 220 MCG/ACT inhaler Flovent  mcg/actuation aerosol inhaler 1 Inhaler 2    montelukast (SINGULAIR) 10 MG tablet TAKE 1 TABLET BY MOUTH EVERY DAY 30 tablet 11    losartan (COZAAR) 50 MG tablet 1 tablet 2 times daily      Evolocumab (REPATHA) 140 MG/ML SOSY Inject into the skin every 14 days       albuterol sulfate HFA (PROAIR HFA) 108 (90 Base) MCG/ACT inhaler Inhale 2 puffs into the lungs every 6 hours as needed for Wheezing      Multiple Vitamins-Minerals (MULTIVITAL-M) TABS Take 1 tablet by mouth daily      aspirin 81 MG chewable tablet Take 81 mg by mouth daily. This patient presents to the office today for a preoperative consultation for urinary frequency at the request of surgeon, Dr. Jesus Moreno, who plans on performing Cystoscopy on August 26 at [facility]. The current problem began3 months ago, and symptoms have been worsening with time.   Conservative therapy: Yes: has seen Urology, which has been ineffective. .    Planned anesthesia: General  Known anesthesia problems: None   Bleeding risk: No recent or remote history of abnormal bleeding  Personal or FH of DVT/PE: No    Patient objection to receiving blood products: No    Past Medical History:   Diagnosis Date    Aortic regurgitation     Aortic valve replaced     Asthma     Hyperlipidemia     Hypertension     Murmur        Past Surgical History:   Procedure Laterality Date    AORTIC VALVE REPLACEMENT  6/17/2014    CYSTOSCOPY      KIDNEY STONE REMOVAL      NASAL SEPTUM SURGERY      POLYPECTOMY      colon polyp    POLYPECTOMY  2011    colon polyp    SHOULDER SURGERY Right        Family History   Problem Relation Age of Onset    Cancer Mother         breast    Heart Failure Father     Hearing Loss Father        Social History     Socioeconomic History    Marital status:      Spouse name: Not on file    Number of children: Not on file    Years of education: Not on file    Highest education level: Not on file   Occupational History    Not on file   Tobacco Use    Smoking status: Never Smoker    Smokeless tobacco: Never Used   Substance and Sexual Activity    Alcohol use: Yes     Comment: occasional    Drug use: No    Sexual activity: Not on file   Other Topics Concern    Not on file   Social History Narrative    Not on file     Social Determinants of Health     Financial Resource Strain:     Difficulty of Paying Living Expenses:    Food Insecurity:     Worried About Running Out of Food in the Last Year:     Ran Out of Food in the Last Year:    Transportation Needs:     Lack of Transportation (Medical):      Lack of Transportation (Non-Medical):    Physical Activity:     Days of Exercise per Week:     Minutes of Exercise per Session:    Stress:     Feeling of Stress :    Social Connections:     Frequency of Communication with Friends and Family:     Frequency of Social Gatherings with Friends and Family:     Attends Catholic Services:     Active Member of Clubs or Organizations:     Attends Club or Organization Meetings:     Marital Status:    Intimate Partner Violence:     Fear of Current or Ex-Partner:     Emotionally Abused:     Physically Abused:     Sexually Abused:        Review of Systems  Review of Systems   Constitutional: Negative for activity change, appetite change, chills, diaphoresis, fatigue, fever and unexpected weight change. HENT: Negative for congestion, ear discharge, ear pain, hearing loss, nosebleeds, postnasal drip, rhinorrhea, sinus pressure, sinus pain, sneezing, sore throat, tinnitus, trouble swallowing and voice change. Eyes: Negative for photophobia, pain, discharge, redness and itching. Respiratory: Negative for cough, choking, chest tightness, shortness of breath, wheezing and stridor. Cardiovascular: Negative for chest pain, palpitations and leg swelling. Gastrointestinal: Negative for abdominal pain, blood in stool, constipation, diarrhea, nausea and vomiting. Endocrine: Negative for cold intolerance, heat intolerance, polydipsia and polyuria. Genitourinary: Negative for difficulty urinating, dysuria, enuresis, flank pain, frequency, hematuria and urgency. Musculoskeletal: Negative for back pain, gait problem, joint swelling, neck pain and neck stiffness. Skin: Negative for color change, pallor, rash and wound. Allergic/Immunologic: Negative for environmental allergies and food allergies. Neurological: Negative for dizziness, tremors, syncope, speech difficulty, weakness, light-headedness, numbness and headaches. Hematological: Negative for adenopathy. Does not bruise/bleed easily. Psychiatric/Behavioral: Negative for agitation, behavioral problems, confusion, decreased concentration, dysphoric mood, hallucinations, self-injury, sleep disturbance and suicidal ideas. The patient is not nervous/anxious and is not hyperactive.         Vitals:    08/24/21 1527   BP: 120/78   Pulse: 67   SpO2: 96%   Weight: 184 lb (83.5 kg)        Physical Exam   Physical Exam  Vitals reviewed. Constitutional:       General: He is not in acute distress. Appearance: Normal appearance. He is well-developed. He is not ill-appearing or diaphoretic. HENT:      Head: Normocephalic and atraumatic. Right Ear: Hearing and external ear normal.      Left Ear: Hearing and external ear normal.      Nose: Nose normal.      Right Sinus: No maxillary sinus tenderness or frontal sinus tenderness. Left Sinus: No maxillary sinus tenderness or frontal sinus tenderness. Eyes:      General:         Right eye: No discharge. Left eye: No discharge. Conjunctiva/sclera: Conjunctivae normal.   Neck:      Thyroid: No thyromegaly. Vascular: No carotid bruit or JVD. Trachea: No tracheal deviation. Cardiovascular:      Rate and Rhythm: Normal rate and regular rhythm. Heart sounds: Murmur heard. No friction rub. Pulmonary:      Effort: Pulmonary effort is normal. No respiratory distress. Breath sounds: Normal breath sounds. No stridor. No decreased breath sounds, wheezing, rhonchi or rales. Musculoskeletal:         General: No tenderness. Normal range of motion. Cervical back: Normal range of motion. No rigidity or tenderness. Right lower leg: No edema. Left lower leg: No edema. Lymphadenopathy:      Cervical: No cervical adenopathy. Skin:     General: Skin is warm and dry. Capillary Refill: Capillary refill takes less than 2 seconds. Findings: No rash. Neurological:      Mental Status: He is alert and oriented to person, place, and time. Sensory: Sensation is intact. Motor: Motor function is intact.       Coordination: Coordination normal.   Psychiatric:         Attention and Perception: Attention and perception normal.         Mood and Affect: Mood normal.         Speech: Speech normal.         Behavior: Behavior normal. Behavior is cooperative. Thought Content: Thought content normal.         Cognition and Memory: Cognition normal.         Judgment: Judgment normal.       He see's Dr. Luke Goodman and Malachi Hein, Stress test ordered and performed, he states he is cleared for surgery. Clearance note faxed by cardiology to urology croup. Lab Review   Office Visit on 07/14/2021   Component Date Value    Cholesterol, Total 07/14/2021 126     Triglycerides 07/14/2021 197*    HDL 07/14/2021 40     LDL Calculated 07/14/2021 47     VLDL Cholesterol Calcula* 07/14/2021 39        Assessment:       57 y.o. patient with planned surgery as above. Known risk factors for perioperative complications: Asthma, Hypertension, murmur  Current medications which may produce withdrawal symptoms if withheld perioperatively: none    Diagnosis Orders   1. Pre-op exam     2. Murmur     3. Prostate disorder     4. Urinary frequency          Plan:     1. Preoperative workup as follows:Cleared by cardiology   2. Change in medication regimen before surgery: Discontinue ASA 7 days before surgery  3. Prophylaxis forcardiac events with perioperative beta-blockers: Not indicated  ACC/AHA indications for pre-operative beta-blocker use:    · Vascular surgery with history of postitive stress test  · Intermediate or high risk surgery with history of CAD   · Intermediate or high risk surgery with multiple clinical predictors of CAD- 2 of the following: history of compensated or prior heart failure, history of cerebrovascular disease, DM, or renal insufficiency    Routine administration of higher-dose, long-acting metoprolol in beta-blockernaïve patients on the day of surgery, and in the absence of dose titration is with an overall increase in mortality. Beta-blockers should be started days to weeks prior to surgery and titrated to pulse < 70.  4. Deep vein thrombosis prophylaxis: regimen to be chosen by surgical team  5.  No contraindications to planned surgery    Cleared for surgery.

## 2021-08-26 NOTE — PROGRESS NOTES
Diana Corbett  [birth year]23 y.o. male   [id number]      HEARING AID EVALUATION    Diana Corbett was seen today, 9/1/2021 , for a Hearing Aid Evaluation following audiologic evaluation on 8/18/21. Diana Corbett was accompanied by his wife. Patient has no prior history of hearing aid use. Patient was found to have insurance benefit of up to $2,500.00 for hearing aids. Patient understands he is responsible for any cost insurance does not pay. Hearing aid benefit worksheet scanned into media tab. DATE: 9/1/2021     PROCEDURES:     SOUNDFIELD TESTING:     Name of test Type of amplification Level of signal Level of noise % Correct        Nu-6  None 55dBHL N/A 68%        Nu-6  None 45dBHL N/A 48%    QuickSIN None 70dBHL varied 4.5 SNR loss       LIFESTYLE EVALUATION:      How do you spend most of your day? 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 Which ear do you use on the phone? Does not head well       Land line or cell phone  iPhone    Has anyone recommended you wear HAs before? No      If yes, have you tried HAs? If no, why not? Do you feel like your hearing loss limits or hampers your personal or social life in any way?  - noisy situations - sometimes at work     Does a hearing problem cause you to feel embarrassed when you meet new people? How do you compensate for things you miss or misunderstand? If feels important will ask to repeat- or will smile and continue    Does a hearing problem cause you to feel frustrated when talking to members of your family? Yes      Do they get frustrated with you? Yes    How does it make you feel when you miss things?  - Don't like it     Do you feel handicapped by your hearing problem?       Does your hearing problem cause you difficulty when visiting friends, relatives, or neighbors? Yes     Does your hearing problem cause you to have arguments with family members? Does your hearing problem cause you to attend Taoism services or group meetings less often than you would like? No    Does your hearing problem cause you difficulty when listening to a TV or radio? Yes    Do others feel that your TV/radio is too loud? Yes    Does a hearing problem cause you difficulty when in a restaurant with relatives or friends? Yes (granddaughter is cheerleader and difficulty hearing in gym)    What % of conversation do you feel that you hear in groups/restaurants?  - less than 50%    When is your hearing loss most problematic?  - conversations with wife and kids    In what situation would you most like to hear better? 1. Personal conversations/social  2. Work  3. Telephone     Do you want to be able to connect directly to your phone with your hearing aids? - Yes    After a discussion of evaluation results, discussion of levels of technology and prices, and lifestyle assessment, Liz Lambert has decided to consider the options and contact Audiology when a decision has been made. .     Technology to be considered: Gloria Sofia (technology TBD). Picked color P5,  power 1(M), medium open dome, AU. Patient cost due at time of fitting: TBD dependent on technology and rechargeable selected. Patient is between premium and advanced quoted at $5,900.00 and $4,900.00 respectively. $300.00 added to total for rechargeable. PATIENT EDUCATION:      - Verbally reviewed benefits and limitations of devices and available features in hearing aids. - Verbally discussed realistic expectations of hearing aid use     Information was shared verbally with patient during appointment. RECOMMENDATIONS:      - Contact Audiology when a decision has been made to pursue hearing aids. No charge for today's appointment.     Rocío Kidd  Audiologist

## 2021-09-01 ENCOUNTER — PROCEDURE VISIT (OUTPATIENT)
Dept: AUDIOLOGY | Age: 61
End: 2021-09-01

## 2021-09-01 DIAGNOSIS — H90.3 SENSORINEURAL HEARING LOSS, BILATERAL: Primary | ICD-10-CM

## 2021-09-01 DIAGNOSIS — H93.13 TINNITUS, BILATERAL: ICD-10-CM

## 2021-09-01 PROCEDURE — 99999 PR OFFICE/OUTPT VISIT,PROCEDURE ONLY: CPT | Performed by: AUDIOLOGIST

## 2021-10-19 ENCOUNTER — TELEPHONE (OUTPATIENT)
Dept: FAMILY MEDICINE CLINIC | Age: 61
End: 2021-10-19

## 2021-10-19 NOTE — TELEPHONE ENCOUNTER
----- Message from Evelyneliot Ramirez sent at 10/19/2021  4:42 PM EDT -----  Subject: Message to Provider    QUESTIONS  Information for Provider? PT called in to request COVID test orders to get   a COVID test prior to his cruise [facility].  ---------------------------------------------------------------------------  --------------  [address]  What is the best way for the office to contact you? OK to leave message on   voicemail  Preferred Call Back Phone Number? [phone]  ---------------------------------------------------------------------------  --------------  SCRIPT ANSWERS  Relationship to Patient?  Self

## 2021-12-02 ENCOUNTER — OFFICE VISIT (OUTPATIENT)
Dept: FAMILY MEDICINE CLINIC | Age: 61
End: 2021-12-02
Payer: COMMERCIAL

## 2021-12-02 VITALS
DIASTOLIC BLOOD PRESSURE: 82 MMHG | OXYGEN SATURATION: 97 % | HEART RATE: 85 BPM | BODY MASS INDEX: 28.43 KG/M2 | SYSTOLIC BLOOD PRESSURE: 140 MMHG | WEIGHT: 187 LBS

## 2021-12-02 DIAGNOSIS — N42.9 PROSTATE DISORDER: ICD-10-CM

## 2021-12-02 DIAGNOSIS — K21.9 GASTROESOPHAGEAL REFLUX DISEASE, UNSPECIFIED WHETHER ESOPHAGITIS PRESENT: Primary | ICD-10-CM

## 2021-12-02 PROCEDURE — 99214 OFFICE O/P EST MOD 30 MIN: CPT

## 2021-12-02 RX ORDER — PANTOPRAZOLE SODIUM 20 MG/1
20 TABLET, DELAYED RELEASE ORAL DAILY
Qty: 30 TABLET | Refills: 3 | Status: SHIPPED | OUTPATIENT
Start: 2021-12-02 | End: 2022-05-17 | Stop reason: SDUPTHER

## 2021-12-02 ASSESSMENT — ENCOUNTER SYMPTOMS
BLOOD IN STOOL: 0
ABDOMINAL DISTENTION: 0
CHOKING: 0
CHEST TIGHTNESS: 0
SHORTNESS OF BREATH: 0
WHEEZING: 0
ABDOMINAL PAIN: 1
VOMITING: 0
ANAL BLEEDING: 0
EYE PAIN: 0
SORE THROAT: 0
RHINORRHEA: 0
COUGH: 0
COLOR CHANGE: 0
TROUBLE SWALLOWING: 0
NAUSEA: 1
EYE DISCHARGE: 0
DIARRHEA: 0
CONSTIPATION: 0

## 2021-12-02 NOTE — PROGRESS NOTES
Chief Complaint   Patient presents with    Abdominal Pain     nausea and burning in stomoch        HPI:  Estefany Grace is a 57 y.o. (: 1960) here today   for evaluation of nausea and burning in stomach. Has been going on x2 months. Feels related to certain foods causing the agitation. States in the last week he feel the feelings of burning and nausea have been almost continuous no matter what he east or drinks. States when he takes Prevacid intermittently and states it helped for 1-2 weeks. Has not taken it recently. Was on Protonix several years ago that really improved his symptoms. Will get occasional cramping also in his abd. Denies any blood in sputum or stool. Pt also today wanted to discuss his hx of BPH. States he has had a Cystoscopy with  that was normal. Was trialed on flomax but cannot take because it makes him feel sick. Pt had to cancel his appoint with Dr. Malik Beckham this past Monday. Patient would like to try medication I will help him urinate more efficiently and empty his bladder more than he is able currently. Patient's medications, allergies, past medical, surgical, social and family histories were reviewed and updated asappropriate. ROS:  Review of Systems   Constitutional: Negative for activity change, appetite change, chills, fatigue, fever and unexpected weight change. HENT: Negative for rhinorrhea, sore throat and trouble swallowing. Eyes: Negative for pain, discharge and visual disturbance. Respiratory: Negative for cough, choking, chest tightness, shortness of breath and wheezing. Cardiovascular: Negative for chest pain, palpitations and leg swelling. Gastrointestinal: Positive for abdominal pain and nausea. Negative for abdominal distention, anal bleeding, blood in stool, constipation, diarrhea and vomiting. Endocrine: Negative for cold intolerance and heat intolerance.    Genitourinary: Positive for difficulty urinating, flank pain, frequency and urgency. Negative for hematuria. Flank pain is not consistent. Comes and goes and is not a new thing since seeing Dr. Bryce Franks. Musculoskeletal: Negative for gait problem and neck stiffness. Skin: Negative for color change and rash. Neurological: Negative for dizziness, weakness and headaches. Psychiatric/Behavioral: Negative for dysphoric mood and sleep disturbance. Prior to Visit Medications    Medication Sig Taking? Authorizing Provider   pantoprazole (PROTONIX) 20 MG tablet Take 1 tablet by mouth daily Yes LEIGHTON Gandhi CNP   fluticasone (FLOVENT HFA) 220 MCG/ACT inhaler Flovent  mcg/actuation aerosol inhaler Yes LEIGHTON Ortega CNP   montelukast (SINGULAIR) 10 MG tablet TAKE 1 TABLET BY MOUTH EVERY DAY Yes LEIGHTON Ortega CNP   losartan (COZAAR) 50 MG tablet 1 tablet 2 times daily Yes Historical Provider, MD   Evolocumab (REPATHA) 140 MG/ML SOSY Inject into the skin every 14 days  Yes Historical Provider, MD   albuterol sulfate HFA (PROAIR HFA) 108 (90 Base) MCG/ACT inhaler Inhale 2 puffs into the lungs every 6 hours as needed for Wheezing Yes Historical Provider, MD   aspirin 81 MG chewable tablet Take 81 mg by mouth daily.  Yes Historical Provider, MD       Allergies   Allergen Reactions    Sulfa Antibiotics Anaphylaxis    Amlodipine Other (See Comments)     headache    Atorvastatin Other (See Comments)     Severe muscle weakness and muscular pain (when on in the past per pt 7/24/19)    Ciprofloxacin Other (See Comments)     torn ligament     Meloxicam Other (See Comments)     \" felt like he had the flu\"    Metoprolol Other (See Comments)     fatigue       OBJECTIVE:    BP (!) 140/82   Pulse 85   Wt 187 lb (84.8 kg)   SpO2 97%   BMI 28.43 kg/m²     BP Readings from Last 2 Encounters:   12/02/21 (!) 140/82   08/24/21 120/78       Wt Readings from Last 3 Encounters:   12/02/21 187 lb (84.8 kg)   08/24/21 184 lb (83.5 kg)   08/18/21 185 lb (83.9 kg)       Physical Exam  Constitutional:       Appearance: Normal appearance. HENT:      Head: Normocephalic and atraumatic. Right Ear: External ear normal.      Left Ear: External ear normal.      Nose: Nose normal. No congestion. Mouth/Throat:      Mouth: Mucous membranes are moist.      Pharynx: Oropharynx is clear. Eyes:      Extraocular Movements: Extraocular movements intact. Pupils: Pupils are equal, round, and reactive to light. Cardiovascular:      Rate and Rhythm: Normal rate and regular rhythm. Pulses: Normal pulses. Heart sounds: Normal heart sounds. No murmur heard. Pulmonary:      Effort: Pulmonary effort is normal. No respiratory distress. Breath sounds: Normal breath sounds. No wheezing. Abdominal:      General: Bowel sounds are normal.      Palpations: Abdomen is soft. Tenderness: There is no abdominal tenderness. Musculoskeletal:         General: Normal range of motion. Cervical back: Normal range of motion and neck supple. Right lower leg: No edema. Left lower leg: No edema. Skin:     General: Skin is warm and dry. Capillary Refill: Capillary refill takes less than 2 seconds. Findings: No rash. Neurological:      General: No focal deficit present. Mental Status: He is alert and oriented to person, place, and time. Motor: No weakness. Psychiatric:         Mood and Affect: Mood normal.         Behavior: Behavior normal.           ASSESSMENT/PLAN:    1. Gastroesophageal reflux disease, unspecified whether esophagitis present  Patient is today for pain in his abdomen he described as burning is accompanied by nausea and occasional abdominal cramping. States he denies seeing any blood in stool or in saliva.   Patient states he has a history of acid reflux and has been in the past placed on Protonix which helped a great deal.  Also has taken Prevacid occasionally which is greatly helped his symptoms as well.  Patient currently not taking any prescribed medication to control reflux. Patient states he currently is having increased issues with heartburn with anything he eats or drinks in the last week which is new, compared to the occasional heartburn he would get with certain foods over the last several months. Given the patient's presentation and description of symptoms I think we should start the patient on Protonix which he has been on previously and had great success controlling his acid reflux. Patient has an office visit scheduled for 01/17/22. I will follow back up with the patient at that time to evaluate the effectiveness of his heartburn symptoms after starting Protonix. - pantoprazole (PROTONIX) 20 MG tablet; Take 1 tablet by mouth daily  Dispense: 30 tablet; Refill: 3    2. Prostate disorder  Patient has ongoing concerns with his prostate enlargement. Patient states he sees Dr. Cristiano Her who has performed cystoscopy and told the patient that he has an enlarged prostate but his urinary tract otherwise is normal.  Patient's been tried on Flomax but was unable to keep taking due to ill feelings with taking the medication. Patient was very concerned about his BPH and wanted to know if there is something I could do about situation at this time. I discussed the patient extensively that I do not want to step in Dr. Gertha Gaucher specialty and treat the patient for BPH when he is currently still under the treatment of the urology group. I discussed the patient he should reschedule his office visit to see  to have a thorough discussion in regard to his concerns in regard to his BPH and urinary symptoms such as urinary frequency, urgency, difficulty urinating. Patient agrees he will contact the office to set up another appointment to see . This document was prepared by a combination of typing and transcription through a voice recognition software.     LEIGHTON Jean Baptiste - CNP

## 2022-01-17 ENCOUNTER — OFFICE VISIT (OUTPATIENT)
Dept: FAMILY MEDICINE CLINIC | Age: 62
End: 2022-01-17
Payer: COMMERCIAL

## 2022-01-17 VITALS
SYSTOLIC BLOOD PRESSURE: 130 MMHG | WEIGHT: 193 LBS | HEIGHT: 68 IN | HEART RATE: 76 BPM | OXYGEN SATURATION: 98 % | BODY MASS INDEX: 29.25 KG/M2 | DIASTOLIC BLOOD PRESSURE: 82 MMHG

## 2022-01-17 DIAGNOSIS — E78.5 HYPERLIPIDEMIA, UNSPECIFIED HYPERLIPIDEMIA TYPE: Primary | ICD-10-CM

## 2022-01-17 DIAGNOSIS — Z86.010 PERSONAL HISTORY OF COLONIC POLYPS: ICD-10-CM

## 2022-01-17 DIAGNOSIS — R53.83 FATIGUE, UNSPECIFIED TYPE: ICD-10-CM

## 2022-01-17 DIAGNOSIS — G47.33 OSA ON CPAP: ICD-10-CM

## 2022-01-17 DIAGNOSIS — Z95.2 AORTIC VALVE REPLACED: ICD-10-CM

## 2022-01-17 DIAGNOSIS — K21.9 GASTROESOPHAGEAL REFLUX DISEASE, UNSPECIFIED WHETHER ESOPHAGITIS PRESENT: ICD-10-CM

## 2022-01-17 DIAGNOSIS — I10 PRIMARY HYPERTENSION: ICD-10-CM

## 2022-01-17 DIAGNOSIS — Z99.89 OSA ON CPAP: ICD-10-CM

## 2022-01-17 DIAGNOSIS — N42.9 PROSTATE DISORDER: ICD-10-CM

## 2022-01-17 DIAGNOSIS — R79.89 LOW VITAMIN D LEVEL: ICD-10-CM

## 2022-01-17 PROCEDURE — 99214 OFFICE O/P EST MOD 30 MIN: CPT | Performed by: FAMILY MEDICINE

## 2022-01-17 ASSESSMENT — ENCOUNTER SYMPTOMS
CONSTIPATION: 0
SHORTNESS OF BREATH: 0
BLOOD IN STOOL: 0

## 2022-01-17 NOTE — PROGRESS NOTES
Chief Complaint   Patient presents with    Hypertension    Hyperlipidemia       HPI:  Sheree Pepe is a 57 y.o. (: 1960) here today   for   Hypertension  This is a chronic problem. The current episode started more than 1 year ago. The problem is unchanged. The problem is controlled. Associated symptoms include palpitations. Pertinent negatives include no chest pain, headaches or shortness of breath. Risk factors for coronary artery disease include dyslipidemia and male gender. Past treatments include angiotensin blockers. There are no compliance problems. Hyperlipidemia  This is a chronic problem. The current episode started more than 1 year ago. Pertinent negatives include no chest pain or shortness of breath. Treatments tried: repatha. There are no compliance problems. Risk factors for coronary artery disease include dyslipidemia, hypertension and male sex. Has seen sig improvement in GI sxs w/ protonix. Took most of December. Has given up caffeine, improved discomfort w/ prostate. Pain less often. Has also helped occas palpitations. Next appt w/ cardio next mo. Hx of aortic valve replaced. Has had echocardiograms. Mild aortic regurg. Ongoing issues w/ fatigue, muscle weakness. Patient's medications, allergies, past medical, surgical, social and family histories were reviewed and updated as appropriate. ROS:  Review of Systems   Constitutional: Positive for fatigue. Respiratory: Negative for shortness of breath. Cardiovascular: Positive for palpitations. Negative for chest pain. Gastrointestinal: Negative for blood in stool and constipation. Neurological: Negative for headaches.            Hemoglobin A1C (%)   Date Value   2021 5.5     LDL Calculated (mg/dL)   Date Value   2021 47       Past Medical History:   Diagnosis Date    Aortic regurgitation     Aortic valve replaced     Asthma     Hyperlipidemia     Hypertension     Murmur        Family History   Problem Relation Age of Onset    Cancer Mother         breast    Heart Failure Father     Hearing Loss Father        Social History     Socioeconomic History    Marital status:      Spouse name: Not on file    Number of children: Not on file    Years of education: Not on file    Highest education level: Not on file   Occupational History    Not on file   Tobacco Use    Smoking status: Never Smoker    Smokeless tobacco: Never Used   Substance and Sexual Activity    Alcohol use: Yes     Comment: occasional    Drug use: No    Sexual activity: Not on file   Other Topics Concern    Not on file   Social History Narrative    Not on file     Social Determinants of Health     Financial Resource Strain:     Difficulty of Paying Living Expenses: Not on file   Food Insecurity:     Worried About Running Out of Food in the Last Year: Not on file    Dmitriy of Food in the Last Year: Not on file   Transportation Needs:     Lack of Transportation (Medical): Not on file    Lack of Transportation (Non-Medical):  Not on file   Physical Activity:     Days of Exercise per Week: Not on file    Minutes of Exercise per Session: Not on file   Stress:     Feeling of Stress : Not on file   Social Connections:     Frequency of Communication with Friends and Family: Not on file    Frequency of Social Gatherings with Friends and Family: Not on file    Attends Religion Services: Not on file    Active Member of [address] [place] [address] [employer] or Organizations: Not on file    Attends Club or Organization Meetings: Not on file    Marital Status: Not on file   Intimate Partner Violence:     Fear of Current or Ex-Partner: Not on file    Emotionally Abused: Not on file    Physically Abused: Not on file    Sexually Abused: Not on file   Housing Stability:     Unable to Pay for Housing in the Last Year: Not on file    Number of [place] in the Last Year: Not on file    Unstable Housing in the Last Year: Not on file Prior to Visit Medications    Medication Sig Taking? Authorizing Provider   Multiple Vitamin (MULTIVITAMIN ADULT PO) Take by mouth daily Yes Historical Provider, MD   pantoprazole (PROTONIX) 20 MG tablet Take 1 tablet by mouth daily Yes LEIGHTON Taveras CNP   fluticasone (FLOVENT HFA) 220 MCG/ACT inhaler Flovent  mcg/actuation aerosol inhaler Yes LEIGHTON Hemphill CNP   montelukast (SINGULAIR) 10 MG tablet TAKE 1 TABLET BY MOUTH EVERY DAY Yes LEIGHTON Hemphill CNP   losartan (COZAAR) 50 MG tablet 1 tablet 2 times daily Yes Historical Provider, MD   Evolocumab (REPATHA) 140 MG/ML SOSY Inject into the skin every 14 days  Yes Historical Provider, MD   albuterol sulfate HFA (PROAIR HFA) 108 (90 Base) MCG/ACT inhaler Inhale 2 puffs into the lungs every 6 hours as needed for Wheezing Yes Historical Provider, MD   aspirin 81 MG chewable tablet Take 81 mg by mouth daily. Yes Historical Provider, MD       Allergies   Allergen Reactions    Sulfa Antibiotics Anaphylaxis    Amlodipine Other (See Comments)     headache    Atorvastatin Other (See Comments)     Severe muscle weakness and muscular pain (when on in the past per pt 7/24/19)    Ciprofloxacin Other (See Comments)     torn ligament     Meloxicam Other (See Comments)     \" felt like he had the flu\"    Metoprolol Other (See Comments)     fatigue       OBJECTIVE:    /82   Pulse 76   Ht 5' 8\" (1.727 m)   Wt 193 lb (87.5 kg)   SpO2 98%   BMI 29.35 kg/m²     BP Readings from Last 2 Encounters:   01/17/22 130/82   12/02/21 (!) 140/82       Wt Readings from Last 3 Encounters:   01/17/22 193 lb (87.5 kg)   12/02/21 187 lb (84.8 kg)   08/24/21 184 lb (83.5 kg)       Physical Exam  Constitutional:       Appearance: Normal appearance. HENT:      Head: Normocephalic and atraumatic. Eyes:      Extraocular Movements: Extraocular movements intact. Cardiovascular:      Rate and Rhythm: Normal rate and regular rhythm.       Heart sounds: Murmur heard. Systolic murmur is present with a grade of 2/6. Pulmonary:      Effort: Pulmonary effort is normal.      Breath sounds: Normal breath sounds. Abdominal:      Palpations: Abdomen is soft. Tenderness: There is no abdominal tenderness. Musculoskeletal:      Right lower leg: No edema. Left lower leg: No edema. Skin:     General: Skin is warm and dry. Neurological:      General: No focal deficit present. Mental Status: He is alert and oriented to person, place, and time. Psychiatric:         Mood and Affect: Mood normal.         Behavior: Behavior normal.           ASSESSMENT/PLAN:    1. Hyperlipidemia, unspecified hyperlipidemia type  Recommend repeat labs prior to cardiology appointment. Tolerating Repatha  - Lipid Panel; Future    2. Aortic valve replaced  Follow-up with cardiology as scheduled. He does have some significant fatigue. Unclear if this is related to his valve    3. Primary hypertension  The current medical regimen is effective;  continue present plan and medications. - Comprehensive Metabolic Panel; Future    4. Prostate disorder  Improved with reducing caffeine intake. Continue medicines. Follow-up with urology as planned  - PSA, Prostatic Specific Antigen; Future    5. Low vitamin D level  Repeat labs  - Vitamin D 25 Hydroxy; Future    6. Fatigue, unspecified type  Suspect multifactorial.  May be related to sleep apnea, medications, cardiovascular issues, others. Repeat labs as below  - T4, Free; Future  - TSH without Reflex; Future  - CBC Auto Differential; Future  - Vitamin B12; Future    7. ALEX on CPAP  Follow-up with sleep specialist as scheduled    8. GERD  Continue PPI for now. Past due for follow-up with GI for repeat C-scope given history of colon polyps. May consider esophagogastroduodenoscopy at the same time. 9. history of colon polyps  Reviewed prior C-scope. Past due for repeat.   Recommend follow-up with GI    This document was prepared by a combination of typing and transcription through a voice recognition software.

## 2022-01-18 ENCOUNTER — NURSE ONLY (OUTPATIENT)
Dept: FAMILY MEDICINE CLINIC | Age: 62
End: 2022-01-18
Payer: COMMERCIAL

## 2022-01-18 DIAGNOSIS — N42.9 PROSTATE DISORDER: ICD-10-CM

## 2022-01-18 DIAGNOSIS — R79.89 LOW VITAMIN D LEVEL: ICD-10-CM

## 2022-01-18 DIAGNOSIS — I10 PRIMARY HYPERTENSION: ICD-10-CM

## 2022-01-18 DIAGNOSIS — E78.5 HYPERLIPIDEMIA, UNSPECIFIED HYPERLIPIDEMIA TYPE: ICD-10-CM

## 2022-01-18 DIAGNOSIS — R53.83 FATIGUE, UNSPECIFIED TYPE: ICD-10-CM

## 2022-01-18 LAB
A/G RATIO: 1.7 (ref 1.1–2.2)
ALBUMIN SERPL-MCNC: 4.5 G/DL (ref 3.4–5)
ALP BLD-CCNC: 79 U/L (ref 40–129)
ALT SERPL-CCNC: 26 U/L (ref 10–40)
ANION GAP SERPL CALCULATED.3IONS-SCNC: 13 MMOL/L (ref 3–16)
AST SERPL-CCNC: 22 U/L (ref 15–37)
BASOPHILS ABSOLUTE: 0.1 K/UL (ref 0–0.2)
BASOPHILS RELATIVE PERCENT: 0.9 %
BILIRUB SERPL-MCNC: 0.5 MG/DL (ref 0–1)
BUN BLDV-MCNC: 19 MG/DL (ref 7–20)
CALCIUM SERPL-MCNC: 9.2 MG/DL (ref 8.3–10.6)
CHLORIDE BLD-SCNC: 102 MMOL/L (ref 99–110)
CHOLESTEROL, TOTAL: 113 MG/DL (ref 0–199)
CO2: 26 MMOL/L (ref 21–32)
CREAT SERPL-MCNC: 1.1 MG/DL (ref 0.8–1.3)
EOSINOPHILS ABSOLUTE: 0.2 K/UL (ref 0–0.6)
EOSINOPHILS RELATIVE PERCENT: 2.1 %
GFR AFRICAN AMERICAN: >60
GFR NON-AFRICAN AMERICAN: >60
GLUCOSE BLD-MCNC: 100 MG/DL (ref 70–99)
HCT VFR BLD CALC: 48.1 % (ref 40.5–52.5)
HDLC SERPL-MCNC: 40 MG/DL (ref 40–60)
HEMOGLOBIN: 16.3 G/DL (ref 13.5–17.5)
LDL CHOLESTEROL CALCULATED: 33 MG/DL
LYMPHOCYTES ABSOLUTE: 1.5 K/UL (ref 1–5.1)
LYMPHOCYTES RELATIVE PERCENT: 20.3 %
MCH RBC QN AUTO: 31.5 PG (ref 26–34)
MCHC RBC AUTO-ENTMCNC: 33.8 G/DL (ref 31–36)
MCV RBC AUTO: 93.2 FL (ref 80–100)
MONOCYTES ABSOLUTE: 0.6 K/UL (ref 0–1.3)
MONOCYTES RELATIVE PERCENT: 8 %
NEUTROPHILS ABSOLUTE: 5.1 K/UL (ref 1.7–7.7)
NEUTROPHILS RELATIVE PERCENT: 68.7 %
PDW BLD-RTO: 12.9 % (ref 12.4–15.4)
PLATELET # BLD: 270 K/UL (ref 135–450)
PMV BLD AUTO: 8.4 FL (ref 5–10.5)
POTASSIUM SERPL-SCNC: 4.7 MMOL/L (ref 3.5–5.1)
PROSTATE SPECIFIC ANTIGEN: 1.37 NG/ML (ref 0–4)
RBC # BLD: 5.16 M/UL (ref 4.2–5.9)
SODIUM BLD-SCNC: 141 MMOL/L (ref 136–145)
T4 FREE: 1.1 NG/DL (ref 0.9–1.8)
TOTAL PROTEIN: 7.1 G/DL (ref 6.4–8.2)
TRIGL SERPL-MCNC: 199 MG/DL (ref 0–150)
TSH SERPL DL<=0.05 MIU/L-ACNC: 2.82 UIU/ML (ref 0.27–4.2)
VITAMIN B-12: 498 PG/ML (ref 211–911)
VITAMIN D 25-HYDROXY: 21.6 NG/ML
VLDLC SERPL CALC-MCNC: 40 MG/DL
WBC # BLD: 7.3 K/UL (ref 4–11)

## 2022-01-18 PROCEDURE — 36415 COLL VENOUS BLD VENIPUNCTURE: CPT | Performed by: FAMILY MEDICINE

## 2022-01-18 NOTE — PROGRESS NOTES
Blood drawn per order. Needle size: 21 g  Site: R Antecubital.  First attempt successful Yes    Second attempt no    Pressure applied until bleeding stopped. Pressure applied. Patient informed to call office or return if bleeding reoccurs and unable to stop.     Tubes drawn: 1 purple     2 red

## 2022-01-19 DIAGNOSIS — R79.89 LOW VITAMIN D LEVEL: Primary | ICD-10-CM

## 2022-01-19 DIAGNOSIS — R79.89 LOW VITAMIN D LEVEL: ICD-10-CM

## 2022-01-19 RX ORDER — ERGOCALCIFEROL (VITAMIN D2) 1250 MCG
50000 CAPSULE ORAL WEEKLY
Qty: 4 CAPSULE | Refills: 3 | Status: SHIPPED | OUTPATIENT
Start: 2022-01-19 | End: 2022-05-17 | Stop reason: SDUPTHER

## 2022-01-19 RX ORDER — ERGOCALCIFEROL (VITAMIN D2) 1250 MCG
50000 CAPSULE ORAL WEEKLY
Qty: 4 CAPSULE | Refills: 0 | Status: SHIPPED | OUTPATIENT
Start: 2022-01-19 | End: 2022-01-19

## 2022-01-19 NOTE — RESULT ENCOUNTER NOTE
Vitamin D level low. Recommend drisdol 50,000 units weekly, #4, 3 refills. Repeat vitamin D level in 4 months. B12 low normal.  Otc supplement recommended. Thyroid labs normal.  Mild inc in trig. O/w lipids well controlled. Similar to last check. Cbc and cmp ok.

## 2022-03-28 ENCOUNTER — TELEPHONE (OUTPATIENT)
Dept: ADMINISTRATIVE | Age: 62
End: 2022-03-28

## 2022-05-11 DIAGNOSIS — J45.909 UNCOMPLICATED ASTHMA, UNSPECIFIED ASTHMA SEVERITY, UNSPECIFIED WHETHER PERSISTENT: ICD-10-CM

## 2022-05-11 RX ORDER — FLUTICASONE PROPIONATE 220 UG/1
AEROSOL, METERED RESPIRATORY (INHALATION)
Qty: 36 G | Refills: 3 | Status: SHIPPED | OUTPATIENT
Start: 2022-05-11

## 2022-05-11 NOTE — TELEPHONE ENCOUNTER
Last office visit 1/17/2022, advised to follow-up 4 months, next appointment 5/17/2022. 1-2 cups/cans per day

## 2022-05-17 ENCOUNTER — OFFICE VISIT (OUTPATIENT)
Dept: FAMILY MEDICINE CLINIC | Age: 62
End: 2022-05-17
Payer: COMMERCIAL

## 2022-05-17 VITALS
WEIGHT: 192 LBS | OXYGEN SATURATION: 97 % | BODY MASS INDEX: 29.19 KG/M2 | HEART RATE: 87 BPM | SYSTOLIC BLOOD PRESSURE: 138 MMHG | DIASTOLIC BLOOD PRESSURE: 82 MMHG

## 2022-05-17 DIAGNOSIS — K21.9 GASTROESOPHAGEAL REFLUX DISEASE, UNSPECIFIED WHETHER ESOPHAGITIS PRESENT: ICD-10-CM

## 2022-05-17 DIAGNOSIS — Z12.11 COLON CANCER SCREENING: ICD-10-CM

## 2022-05-17 DIAGNOSIS — J45.20 MILD INTERMITTENT ASTHMA WITHOUT COMPLICATION: ICD-10-CM

## 2022-05-17 DIAGNOSIS — R79.89 LOW VITAMIN D LEVEL: ICD-10-CM

## 2022-05-17 DIAGNOSIS — I10 ESSENTIAL HYPERTENSION: Primary | ICD-10-CM

## 2022-05-17 DIAGNOSIS — E78.5 HYPERLIPIDEMIA, UNSPECIFIED HYPERLIPIDEMIA TYPE: ICD-10-CM

## 2022-05-17 LAB
A/G RATIO: 1.8 (ref 1.1–2.2)
ALBUMIN SERPL-MCNC: 4.4 G/DL (ref 3.4–5)
ALP BLD-CCNC: 83 U/L (ref 40–129)
ALT SERPL-CCNC: 36 U/L (ref 10–40)
ANION GAP SERPL CALCULATED.3IONS-SCNC: 14 MMOL/L (ref 3–16)
AST SERPL-CCNC: 23 U/L (ref 15–37)
BILIRUB SERPL-MCNC: 0.5 MG/DL (ref 0–1)
BUN BLDV-MCNC: 14 MG/DL (ref 7–20)
CALCIUM SERPL-MCNC: 9.2 MG/DL (ref 8.3–10.6)
CHLORIDE BLD-SCNC: 104 MMOL/L (ref 99–110)
CHOLESTEROL, TOTAL: 112 MG/DL (ref 0–199)
CO2: 25 MMOL/L (ref 21–32)
CREAT SERPL-MCNC: 1 MG/DL (ref 0.8–1.3)
GFR AFRICAN AMERICAN: >60
GFR NON-AFRICAN AMERICAN: >60
GLUCOSE BLD-MCNC: 105 MG/DL (ref 70–99)
HDLC SERPL-MCNC: 38 MG/DL (ref 40–60)
LDL CHOLESTEROL CALCULATED: 42 MG/DL
POTASSIUM SERPL-SCNC: 4.5 MMOL/L (ref 3.5–5.1)
SODIUM BLD-SCNC: 143 MMOL/L (ref 136–145)
TOTAL PROTEIN: 6.9 G/DL (ref 6.4–8.2)
TRIGL SERPL-MCNC: 162 MG/DL (ref 0–150)
VLDLC SERPL CALC-MCNC: 32 MG/DL

## 2022-05-17 PROCEDURE — 99214 OFFICE O/P EST MOD 30 MIN: CPT

## 2022-05-17 RX ORDER — ONDANSETRON 4 MG/1
4 TABLET, FILM COATED ORAL 3 TIMES DAILY PRN
Qty: 45 TABLET | Refills: 0 | Status: SHIPPED | OUTPATIENT
Start: 2022-05-17 | End: 2022-06-01

## 2022-05-17 RX ORDER — MONTELUKAST SODIUM 10 MG/1
TABLET ORAL
Qty: 30 TABLET | Refills: 11 | Status: SHIPPED | OUTPATIENT
Start: 2022-05-17

## 2022-05-17 RX ORDER — ERGOCALCIFEROL (VITAMIN D2) 1250 MCG
50000 CAPSULE ORAL WEEKLY
Qty: 4 CAPSULE | Refills: 5 | Status: SHIPPED | OUTPATIENT
Start: 2022-05-17

## 2022-05-17 RX ORDER — LISINOPRIL 20 MG/1
20 TABLET ORAL DAILY
COMMUNITY
Start: 2022-04-20

## 2022-05-17 RX ORDER — PANTOPRAZOLE SODIUM 20 MG/1
20 TABLET, DELAYED RELEASE ORAL DAILY
Qty: 30 TABLET | Refills: 11 | Status: SHIPPED | OUTPATIENT
Start: 2022-05-17

## 2022-05-17 ASSESSMENT — ENCOUNTER SYMPTOMS
RHINORRHEA: 0
TROUBLE SWALLOWING: 0
ABDOMINAL DISTENTION: 0
COLOR CHANGE: 0
EYE DISCHARGE: 0
WHEEZING: 0
SHORTNESS OF BREATH: 0
COUGH: 0
ABDOMINAL PAIN: 0
EYE PAIN: 0
CHOKING: 0
NAUSEA: 1
SORE THROAT: 0
DIARRHEA: 0
CHEST TIGHTNESS: 0
CONSTIPATION: 0

## 2022-05-17 ASSESSMENT — PATIENT HEALTH QUESTIONNAIRE - PHQ9
SUM OF ALL RESPONSES TO PHQ QUESTIONS 1-9: 0
SUM OF ALL RESPONSES TO PHQ QUESTIONS 1-9: 0
SUM OF ALL RESPONSES TO PHQ9 QUESTIONS 1 & 2: 0
SUM OF ALL RESPONSES TO PHQ QUESTIONS 1-9: 0
SUM OF ALL RESPONSES TO PHQ QUESTIONS 1-9: 0
2. FEELING DOWN, DEPRESSED OR HOPELESS: 0
1. LITTLE INTEREST OR PLEASURE IN DOING THINGS: 0

## 2022-05-17 NOTE — PROGRESS NOTES
Chief Complaint   Patient presents with    Hypertension    Hyperlipidemia    Gastroesophageal Reflux     nausea       HPI:  Jocelyn Lala is a 57 y.o. (: 1960) here today   for 4 month f/u. Needs referral to gastro for [facility]. Cannot get in to see  his Stevenson Mercedes in [facility]. Lg Lamb. has frequent reflux with nausea. Is taking Protonix 20mg daily. When stops taking Protonix symptoms come back. While on symptoms are stable. Also needs updated colonoscopy. Hx of colon polyps. Would like to have something for nausea PRN until he can be seen by GI. Needs refill on Singulair. Asthma is well controlled on current med reg. HTN: /82 in office today. Odalis May took pt off of his Losartan due to depression. Feels the Losartan was causing the symptoms of depression and fatigue. Since being started on Lisinopril 5mg daily by  his symptoms have subsided. States BP has been more elevated than it was on Losartan and has not made  aware. Stable today. No changes will be made today. Advised to notify  of running BP at home and our result in office today. Is on Vitamin D 50,000 units weekly. Last Vitamin D low at 21.6 on 22. Is on Repatha every 14 days injections. Patient's medications, allergies, past medical, surgical, social and family histories were reviewed and updated as appropriate. ROS:  Review of Systems   Constitutional: Negative for activity change, appetite change, chills, fatigue, fever and unexpected weight change. HENT: Negative for rhinorrhea, sore throat and trouble swallowing. Eyes: Negative for pain, discharge and visual disturbance. Respiratory: Negative for cough, choking, chest tightness, shortness of breath and wheezing. Cardiovascular: Negative for chest pain, palpitations and leg swelling. Gastrointestinal: Positive for nausea. Negative for abdominal distention, abdominal pain, constipation and diarrhea. Reflux     Endocrine: Negative for cold intolerance and heat intolerance. Genitourinary: Negative for difficulty urinating. Musculoskeletal: Negative for gait problem and neck stiffness. Skin: Negative for color change and rash. Neurological: Negative for dizziness, weakness and headaches. Psychiatric/Behavioral: Negative for dysphoric mood and sleep disturbance. Hemoglobin A1C (%)   Date Value   04/23/2021 5.5     LDL Calculated (mg/dL)   Date Value   01/18/2022 33       Past Medical History:   Diagnosis Date    Aortic regurgitation     Aortic valve replaced     Asthma     Hyperlipidemia     Hypertension     Murmur        Family History   Problem Relation Age of Onset    Cancer Mother         breast    Heart Failure Father     Hearing Loss Father        Social History     Socioeconomic History    Marital status:      Spouse name: Not on file    Number of children: Not on file    Years of education: Not on file    Highest education level: Not on file   Occupational History    Not on file   Tobacco Use    Smoking status: Never Smoker    Smokeless tobacco: Never Used   Substance and Sexual Activity    Alcohol use: Yes     Comment: occasional    Drug use: No    Sexual activity: Not on file   Other Topics Concern    Not on file   Social History Narrative    Not on file     Social Determinants of Health     Financial Resource Strain:     Difficulty of Paying Living Expenses: Not on file   Food Insecurity:     Worried About Running Out of Food in the Last Year: Not on file    Dmitriy of Food in the Last Year: Not on file   Transportation Needs:     Lack of Transportation (Medical): Not on file    Lack of Transportation (Non-Medical):  Not on file   Physical Activity:     Days of Exercise per Week: Not on file    Minutes of Exercise per Session: Not on file   Stress:     Feeling of Stress : Not on file   Social Connections:     Frequency of Communication with Friends and Family: Not on file    Frequency of Social Gatherings with Friends and Family: Not on file    Attends Rastafari Services: Not on file    Active Member of Clubs or Organizations: Not on file    Attends Club or Organization Meetings: Not on file    Marital Status: Not on file   Intimate Partner Violence:     Fear of Current or Ex-Partner: Not on file    Emotionally Abused: Not on file    Physically Abused: Not on file    Sexually Abused: Not on file   Housing Stability:     Unable to Pay for Housing in the Last Year: Not on file    Number of [place] in the Last Year: Not on file    Unstable Housing in the Last Year: Not on file       Prior to Visit Medications    Medication Sig Taking? Authorizing Provider   lisinopril (PRINIVIL;ZESTRIL) 5 MG tablet Take 5 mg by mouth daily  Yes Historical Provider, MD   ergocalciferol (DRISDOL) 1.25 MG (61807 UT) capsule Take 1 capsule by mouth once a week Yes LEIGHTON Santos CNP   pantoprazole (PROTONIX) 20 MG tablet Take 1 tablet by mouth daily Yes LEIGHTON Santos CNP   montelukast (SINGULAIR) 10 MG tablet TAKE 1 TABLET BY MOUTH EVERY DAY Yes LEIGHTON Santos CNP   ondansetron (ZOFRAN) 4 MG tablet Take 1 tablet by mouth 3 times daily as needed for Nausea or Vomiting Yes LEIGHTON Santos CNP   fluticasone (FLOVENT HFA) 220 MCG/ACT inhaler INHALE 1 PUFF INTO THE LUNGS DAILY AS NEEDED Yes LEIGHTON Santos CNP   Multiple Vitamin (MULTIVITAMIN ADULT PO) Take by mouth daily Yes Historical Provider, MD   Evolocumab (REPATHA) 140 MG/ML SOSY Inject into the skin every 14 days  Yes Historical Provider, MD   albuterol sulfate HFA (PROAIR HFA) 108 (90 Base) MCG/ACT inhaler Inhale 2 puffs into the lungs every 6 hours as needed for Wheezing Yes Historical Provider, MD   aspirin 81 MG chewable tablet Take 81 mg by mouth daily.  Yes Historical Provider, MD       Allergies   Allergen Reactions    Sulfa Antibiotics Anaphylaxis    Amlodipine Other (See Comments)     headache    Atorvastatin Other (See Comments)     Severe muscle weakness and muscular pain (when on in the past per pt 7/24/19)    Bisoprolol Other (See Comments)     Extreme fatigue and bradycardia per pt    Ciprofloxacin Other (See Comments)     torn ligament     Meloxicam Other (See Comments)     \" felt like he had the flu\"    Metoprolol Other (See Comments)     fatigue       OBJECTIVE:    /82   Pulse 87   Wt 192 lb (87.1 kg)   SpO2 97%   BMI 29.19 kg/m²     BP Readings from Last 2 Encounters:   05/17/22 138/82   01/17/22 130/82       Wt Readings from Last 3 Encounters:   05/17/22 192 lb (87.1 kg)   01/17/22 193 lb (87.5 kg)   12/02/21 187 lb (84.8 kg)       Physical Exam  Constitutional:       Appearance: Normal appearance. HENT:      Head: Normocephalic and atraumatic. Right Ear: External ear normal.      Left Ear: External ear normal.      Nose: Nose normal. No congestion. Mouth/Throat:      Mouth: Mucous membranes are moist.      Pharynx: Oropharynx is clear. Eyes:      Extraocular Movements: Extraocular movements intact. Pupils: Pupils are equal, round, and reactive to light. Cardiovascular:      Rate and Rhythm: Normal rate and regular rhythm. Pulses: Normal pulses. Heart sounds: Murmur heard. Pulmonary:      Effort: Pulmonary effort is normal. No respiratory distress. Breath sounds: Normal breath sounds. No wheezing. Abdominal:      General: Bowel sounds are normal.      Palpations: Abdomen is soft. Tenderness: There is no abdominal tenderness. Musculoskeletal:         General: Normal range of motion. Cervical back: Normal range of motion and neck supple. Right lower leg: No edema. Left lower leg: No edema. Skin:     General: Skin is warm and dry. Capillary Refill: Capillary refill takes less than 2 seconds. Findings: No rash.    Neurological:      General: No focal deficit present. Mental Status: He is alert and oriented to person, place, and time. Motor: No weakness. Psychiatric:         Mood and Affect: Mood normal.         Behavior: Behavior normal.           ASSESSMENT/PLAN:    1. Essential hypertension  BP stable in office today 138/82. Repeat labs ordered today. Patient was advised to notify Dr. Cheyenne Villarreal office when his blood pressure has been running more toward the high end of the normal range since being switched off of losartan and placed on 5 mg daily of lisinopril. Verbalized to the patient that his blood pressures been running as of this today in office or even higher he would most likely benefit from being placed on 10 mg daily dosing of lisinopril. No medication changes today. Patient will contact his cardiologist in this regard. - Comprehensive Metabolic Panel    2. Gastroesophageal reflux disease, unspecified whether esophagitis present  Reflux controlled if taking Protonix 20 mg daily. Still has having nausea frequently related to his GERD. Patient is try to take Protonix intermittently. I advised the patient to take Protonix daily until seen by GI specialist.  The patient may need increased dosing to twice daily dosing or possibly updated EGD for evaluation of his condition. Patient is also due for colonoscopy for colon cancer screening related to history of colon polyps. Told the patient most likely he could have an EGD and a colonoscopy if the GI specialist deems necessary.  - pantoprazole (PROTONIX) 20 MG tablet; Take 1 tablet by mouth daily  Dispense: 30 tablet; Refill: 11  - ondansetron (ZOFRAN) 4 MG tablet; Take 1 tablet by mouth 3 times daily as needed for Nausea or Vomiting  Dispense: 45 tablet; Refill: 0    3. Mild intermittent asthma without complication  Medication refill today. Asthma controlled on current medication regimen. We will continue on current medication regimen at this time.   - montelukast (SINGULAIR) 10 MG tablet; TAKE 1 TABLET BY MOUTH EVERY DAY  Dispense: 30 tablet; Refill: 11    4. Hyperlipidemia, unspecified hyperlipidemia type  Repeat labs today. We will follow-up with patient on testing results. Encouraged the patient to follow healthy diet to reduce bad fat consumption as well as reduce triglyceride result from previous check. Patient does have mild coronary artery disease based on recent CT in March ordered by Dr. Karen Merida    5. Low vitamin D level  Repeat labs today. Medication refilled today, see below. - ergocalciferol (DRISDOL) 1.25 MG (30165 UT) capsule; Take 1 capsule by mouth once a week  Dispense: 4 capsule; Refill: 5  - Vitamin D 25 Hydroxy    6. Colon cancer screening  History of colon polyps. Patient not seen his GI specialist in [facility] due to availability.  - Fran Crockett DO, Gastroenterology, [facility]    35 minutes spent on patient care today      This document was prepared by a combination of typing and transcription through a voice recognition software.     LEIGHTON Vásquez - CNP

## 2022-05-18 LAB — VITAMIN D 25-HYDROXY: 51 NG/ML

## 2022-06-02 NOTE — PROGRESS NOTES

## 2022-06-08 ENCOUNTER — ANESTHESIA EVENT (OUTPATIENT)
Dept: ENDOSCOPY | Age: 62
End: 2022-06-08
Payer: COMMERCIAL

## 2022-06-08 ENCOUNTER — ANESTHESIA (OUTPATIENT)
Dept: ENDOSCOPY | Age: 62
End: 2022-06-08
Payer: COMMERCIAL

## 2022-06-08 ENCOUNTER — HOSPITAL ENCOUNTER (OUTPATIENT)
Age: 62
Setting detail: OUTPATIENT SURGERY
Discharge: HOME | End: 2022-06-08
Attending: INTERNAL MEDICINE | Admitting: INTERNAL MEDICINE
Payer: COMMERCIAL

## 2022-06-08 VITALS
BODY MASS INDEX: 28.79 KG/M2 | RESPIRATION RATE: 16 BRPM | HEIGHT: 68 IN | SYSTOLIC BLOOD PRESSURE: 132 MMHG | OXYGEN SATURATION: 97 % | HEART RATE: 60 BPM | TEMPERATURE: 97 F | DIASTOLIC BLOOD PRESSURE: 71 MMHG | WEIGHT: 190 LBS

## 2022-06-08 DIAGNOSIS — R11.0 NAUSEA: ICD-10-CM

## 2022-06-08 DIAGNOSIS — Z86.010 HISTORY OF COLON POLYPS: ICD-10-CM

## 2022-06-08 PROCEDURE — 7100000011 HC PHASE II RECOVERY - ADDTL 15 MIN: Performed by: INTERNAL MEDICINE

## 2022-06-08 PROCEDURE — 2709999900 HC NON-CHARGEABLE SUPPLY: Performed by: INTERNAL MEDICINE

## 2022-06-08 PROCEDURE — 3609010600 HC COLONOSCOPY POLYPECTOMY SNARE/COLD BIOPSY: Performed by: INTERNAL MEDICINE

## 2022-06-08 PROCEDURE — 2500000003 HC RX 250 WO HCPCS: Performed by: ANESTHESIOLOGY

## 2022-06-08 PROCEDURE — 3700000001 HC ADD 15 MINUTES (ANESTHESIA): Performed by: INTERNAL MEDICINE

## 2022-06-08 PROCEDURE — 2580000003 HC RX 258: Performed by: ANESTHESIOLOGY

## 2022-06-08 PROCEDURE — 7100000010 HC PHASE II RECOVERY - FIRST 15 MIN: Performed by: INTERNAL MEDICINE

## 2022-06-08 PROCEDURE — 88305 TISSUE EXAM BY PATHOLOGIST: CPT

## 2022-06-08 PROCEDURE — 3700000000 HC ANESTHESIA ATTENDED CARE: Performed by: INTERNAL MEDICINE

## 2022-06-08 PROCEDURE — 2580000003 HC RX 258: Performed by: INTERNAL MEDICINE

## 2022-06-08 PROCEDURE — 3609012400 HC EGD TRANSORAL BIOPSY SINGLE/MULTIPLE: Performed by: INTERNAL MEDICINE

## 2022-06-08 PROCEDURE — 6360000002 HC RX W HCPCS: Performed by: ANESTHESIOLOGY

## 2022-06-08 RX ORDER — SODIUM CHLORIDE, SODIUM LACTATE, POTASSIUM CHLORIDE, CALCIUM CHLORIDE 600; 310; 30; 20 MG/100ML; MG/100ML; MG/100ML; MG/100ML
INJECTION, SOLUTION INTRAVENOUS CONTINUOUS PRN
Status: DISCONTINUED | OUTPATIENT
Start: 2022-06-08 | End: 2022-06-08 | Stop reason: SDUPTHER

## 2022-06-08 RX ORDER — LIDOCAINE HYDROCHLORIDE 10 MG/ML
0.1 INJECTION, SOLUTION EPIDURAL; INFILTRATION; INTRACAUDAL; PERINEURAL
Status: DISCONTINUED | OUTPATIENT
Start: 2022-06-08 | End: 2022-06-08 | Stop reason: HOSPADM

## 2022-06-08 RX ORDER — PROPOFOL 10 MG/ML
INJECTION, EMULSION INTRAVENOUS PRN
Status: DISCONTINUED | OUTPATIENT
Start: 2022-06-08 | End: 2022-06-08 | Stop reason: SDUPTHER

## 2022-06-08 RX ORDER — SODIUM CHLORIDE, SODIUM LACTATE, POTASSIUM CHLORIDE, CALCIUM CHLORIDE 600; 310; 30; 20 MG/100ML; MG/100ML; MG/100ML; MG/100ML
INJECTION, SOLUTION INTRAVENOUS ONCE
Status: COMPLETED | OUTPATIENT
Start: 2022-06-08 | End: 2022-06-08

## 2022-06-08 RX ORDER — LIDOCAINE HYDROCHLORIDE 20 MG/ML
INJECTION, SOLUTION INFILTRATION; PERINEURAL PRN
Status: DISCONTINUED | OUTPATIENT
Start: 2022-06-08 | End: 2022-06-08 | Stop reason: SDUPTHER

## 2022-06-08 RX ADMIN — SODIUM CHLORIDE, POTASSIUM CHLORIDE, SODIUM LACTATE AND CALCIUM CHLORIDE: 600; 310; 30; 20 INJECTION, SOLUTION INTRAVENOUS at 08:30

## 2022-06-08 RX ADMIN — PROPOFOL 20 MG: 10 INJECTION, EMULSION INTRAVENOUS at 09:24

## 2022-06-08 RX ADMIN — PROPOFOL 20 MG: 10 INJECTION, EMULSION INTRAVENOUS at 09:16

## 2022-06-08 RX ADMIN — PROPOFOL 10 MG: 10 INJECTION, EMULSION INTRAVENOUS at 09:09

## 2022-06-08 RX ADMIN — PROPOFOL 20 MG: 10 INJECTION, EMULSION INTRAVENOUS at 09:26

## 2022-06-08 RX ADMIN — PROPOFOL 20 MG: 10 INJECTION, EMULSION INTRAVENOUS at 09:08

## 2022-06-08 RX ADMIN — SODIUM CHLORIDE, SODIUM LACTATE, POTASSIUM CHLORIDE, AND CALCIUM CHLORIDE: .6; .31; .03; .02 INJECTION, SOLUTION INTRAVENOUS at 09:02

## 2022-06-08 RX ADMIN — PROPOFOL 20 MG: 10 INJECTION, EMULSION INTRAVENOUS at 09:14

## 2022-06-08 RX ADMIN — LIDOCAINE HYDROCHLORIDE 80 MG: 20 INJECTION, SOLUTION INFILTRATION; PERINEURAL at 09:06

## 2022-06-08 RX ADMIN — PROPOFOL 10 MG: 10 INJECTION, EMULSION INTRAVENOUS at 09:10

## 2022-06-08 RX ADMIN — PROPOFOL 70 MG: 10 INJECTION, EMULSION INTRAVENOUS at 09:07

## 2022-06-08 ASSESSMENT — PAIN SCALES - GENERAL
PAINLEVEL_OUTOF10: 0

## 2022-06-08 NOTE — ANESTHESIA POSTPROCEDURE EVALUATION
Department of Anesthesiology  Postprocedure Note    Patient: Dany Rico  MRN: [id number]  YOB: 1960  Date of evaluation: 6/8/2022  Time:  9:44 AM     Procedure Summary     Date: 06/08/22 Room / Location: [facility] [address]    Anesthesia Start: 0902 Anesthesia Stop: 2818    Procedures:       COLONOSCOPY POLYPECTOMY SNARE/COLD BIOPSY (N/A )      EGD BIOPSY (N/A ) Diagnosis:       Nausea      History of colon polyps      (History of colon polyps [Z86.010] Nausea [R11.0])    Surgeons: Aminata Matson MD Responsible Provider: Lacy Winslow MD    Anesthesia Type: MAC ASA Status: 3          Anesthesia Type: No value filed. Shantell Phase I: Shantell Score: 10    Shantell Phase II: Shantell Score: 10    Last vitals: Reviewed and per EMR flowsheets.        Anesthesia Post Evaluation    Patient location during evaluation: PACU  Patient participation: complete - patient participated  Level of consciousness: awake and alert  Pain score: 0  Airway patency: patent  Nausea & Vomiting: no nausea and no vomiting  Complications: no  Cardiovascular status: blood pressure returned to baseline  Respiratory status: acceptable  Hydration status: stable

## 2022-06-08 NOTE — PROCEDURES
Endoscopy Note    Patient: Isra Gallegos  : 1960      Procedure: Esophagogastroduodenoscopy with biopsies    Date:  2022     Surgeon:  Raysa Cantu MD     Referring Physician:  LEIGHTON Deng CNP      History:      INDICATION: Epigastric pain     ASA: 3  SEDATION: MAC         Operative Surgeon: Raysa Cantu MD  Scope Type: Gastroscope      Preoperative Diagnosis: Epigastric pain  Postoperative Diagnosis: Gastritis hiatal hernia    Procedure Performed: EGD    Procedure Details:    With the patient in left lateral position the endoscope was passed through the hypopharynx into the esophagus. The scope as then passed through the esophagus to the second portion of the duodenum. All visualized portions were carefully inspected. The gastric air was suctioned and the scope as removed. Patient tolerated the procedure well. Findings and maneuvers are discussed below. Complications:  None  Estimated Blood Loss: minimal to none    Post Operative Findings:   Esophagus: Esophageal mucosa was normal throughout the entire esophagus. There is a nonobstructing Schatzki's ring at the GE junction. There is no sign of esophagitis or Purcell's esophagus. 2 to 3 cm hiatal hernia. Stomach: Antritis without ulceration. Retroflexion demonstrated small hiatal hernia biopsies were taken to rule out H. pylori otherwise normal  Duodenum: Minimal duodenitis in the duodenal bulb otherwise normal    Plan: Continue PPI for symptoms. Suspect gastroesophageal reflux disease. Signed By: MD Raysa Hernandez MD,   [facility]  [phone]    Please note that some or all of this record was generated using voice recognition software. If there are any questions about the content of this document, please contact the author as some errors in translation may have occurred.        Colonoscopy Procedure  Note          Patient: Isra Gallegos  : 1960      Procedure: Colonoscopy with cold snare polypectomy    Date:  6/8/2022    Primary Care Physician: LEIGHTON Morales - CNP     Operative surgeon: Monico Cogan, MD  Previous Colonoscopy: Yes  Consent: I explained and discussed the risk, benefits and alternatives for the procedure with the patient and obtained the patient's consent for the procedure. We discussed the specific risks including bleeding, perforation, post-procedure abdominal pain, and missed lesions which could lead to interval colorectal cancers. History:       Past Medical History:   Diagnosis Date    A-fib [facility])     for short time after first aortic valve replacement    Aortic regurgitation     Aortic valve replaced     Asthma     Hyperlipidemia     Hypertension     Kidney stone     Murmur     Sleep apnea     uses CPAP      Preoperative Diagnosis: History of colon polyps [Z86.010] Nausea [R11.0]  Post Operative Diagnosis: Colon polyps  ASA: 3  SEDATION: MAC      Procedures Performed: Colonoscopy   Scope Type: Adult    Procedure Details:      With the patient in left lateral decubitus position the endoscope was inserted through the anorectal area into the rectum. The scope was then advanced through the length of the colon to the cecum and terminal ileum. The quality of preparation was good. The scope was carefully withdrawn with careful inspection. Images were taken of the multiple segments of colon, cecum, IC valve, rectum, terminal ileum. Retroflexion was preformed in the rectum. Cecum Intubated: Yes  EBL: minimal to none  Complications:  no complications were noted  Post-operative Findings: Perianal exam unremarkable, digital rectal exam unremarkable, retroflexion rectum did not demonstrate significant hemorrhoids    There is 6 separate sessile polyps removed from mostly the ascending colon and proximal transverse. Ranging from 3 to 8 mm in size all cold snare resection retrieval complete.     In the sigmoid colon there was a 3 mm polyp removed with cold snare resection treated for complete    Scattered diverticulosis    Otherwise colonic mucosa in the terminal ileum were normal.    Plan: Repeat colonoscopy in 3 years for colon cancer screening purposes. Signed By: MD Desiree Meadows MD,   [facility]  6/8/2022      Please note that some or all of this record was generated using voice recognition software. If there are any questions about the content of this document, please contact the author as some errors in translation may have occurred.

## 2022-06-08 NOTE — H&P
[facility]   Pre-operative History and Physical    Patient: Maurisio Lomas  : 1960  Acct#:     HISTORY OF PRESENT ILLNESS:    The patient is a 57 y.o. male who presents for epigastric pain and reflux. History of colon polyps    Indications: Epigastric pain, colon cancer screening history of colon polyps    Past Medical History:        Diagnosis Date    A-fib [facility])     for short time after first aortic valve replacement    Aortic regurgitation     Aortic valve replaced     Asthma     Hyperlipidemia     Hypertension     Kidney stone     Murmur     Sleep apnea     uses CPAP      Past Surgical History:        Procedure Laterality Date    ANTERIOR CRUCIATE LIGAMENT REPAIR Right     AORTIC VALVE REPLACEMENT  2014, 2019    X 2    KIDNEY STONE REMOVAL      NASAL SEPTUM SURGERY      POLYPECTOMY  2011    colon polyp    SHOULDER SURGERY Right       Medications Prior to Admission:   No current facility-administered medications on file prior to encounter.      Current Outpatient Medications on File Prior to Encounter   Medication Sig Dispense Refill    lisinopril (PRINIVIL;ZESTRIL) 5 MG tablet Take 5 mg by mouth daily       ergocalciferol (DRISDOL) 1.25 MG (28809 UT) capsule Take 1 capsule by mouth once a week 4 capsule 5    pantoprazole (PROTONIX) 20 MG tablet Take 1 tablet by mouth daily 30 tablet 11    montelukast (SINGULAIR) 10 MG tablet TAKE 1 TABLET BY MOUTH EVERY DAY 30 tablet 11    fluticasone (FLOVENT HFA) 220 MCG/ACT inhaler INHALE 1 PUFF INTO THE LUNGS DAILY AS NEEDED (Patient taking differently: Inhale 1 puff into the lungs 2 times daily INHALE 1 PUFF INTO THE LUNGS DAILY AS N) 36 g 3    Multiple Vitamin (MULTIVITAMIN ADULT PO) Take by mouth daily      Evolocumab (REPATHA) 140 MG/ML SOSY Inject into the skin every 14 days       albuterol sulfate HFA (PROAIR HFA) 108 (90 Base) MCG/ACT inhaler Inhale 2 puffs into the lungs every 6 hours as needed for Wheezing      aspirin 81 MG chewable tablet Take 81 mg by mouth daily. Allergies:  Sulfa antibiotics, Amlodipine, Atorvastatin, Bisoprolol, Ciprofloxacin, Meloxicam, and Metoprolol    Social History:   Social History     Socioeconomic History    Marital status:      Spouse name: Not on file    Number of children: Not on file    Years of education: Not on file    Highest education level: Not on file   Occupational History    Not on file   Tobacco Use    Smoking status: Never Smoker    Smokeless tobacco: Never Used   Substance and Sexual Activity    Alcohol use: Yes     Comment: 2-3 drinks per month    Drug use: No    Sexual activity: Not on file   Other Topics Concern    Not on file   Social History Narrative    Not on file     Social Determinants of Health     Financial Resource Strain:     Difficulty of Paying Living Expenses: Not on file   Food Insecurity:     Worried About Running Out of Food in the Last Year: Not on file    Dmitriy of Food in the Last Year: Not on file   Transportation Needs:     Lack of Transportation (Medical): Not on file    Lack of Transportation (Non-Medical):  Not on file   Physical Activity:     Days of Exercise per Week: Not on file    Minutes of Exercise per Session: Not on file   Stress:     Feeling of Stress : Not on file   Social Connections:     Frequency of Communication with Friends and Family: Not on file    Frequency of Social Gatherings with Friends and Family: Not on file    Attends Sabianist Services: Not on file    Active Member of Clubs or Organizations: Not on file    Attends Club or Organization Meetings: Not on file    Marital Status: Not on file   Intimate Partner Violence:     Fear of Current or Ex-Partner: Not on file    Emotionally Abused: Not on file    Physically Abused: Not on file    Sexually Abused: Not on file   Housing Stability:     Unable to Pay for Housing in the Last Year: Not on file    Number of [place] in the Last Year: Not on file    Unstable Housing in the Last Year: Not on file      Family History:       Problem Relation Age of Onset    Cancer Mother         breast    Heart Failure Father     Hearing Loss Father         PHYSICAL EXAM:      BP (!) 151/74   Pulse 70   Temp 97 °F (36.1 °C)   Resp 16   Ht 5' 8\" (1.727 m)   Wt 190 lb (86.2 kg)   SpO2 98%   BMI 28.89 kg/m²  I        Heart:  Normal apical impulse, regular rate and rhythm, normal S1 and S2, no S3 or S4, and no murmur noted    Lungs:  No increased work of breathing, good air exchange, clear to auscultation bilaterally, no crackles or wheezing    Abdomen:  No scars, normal bowel sounds, soft, non-distended, non-tender, no masses palpated, no hepatosplenomegally      ASA Class  ASA 3 - Patient with moderate systemic disease with functional limitations    Mallampati Class: 3      ASSESSMENT AND PLAN:    1. Patient is a suitable candidate for endoscopic procedure and attendant anesthesia  2. Risks, benefits, alternatives of procedure discussed in detail with patient including risks of bleeding, infection, perforation, risks of sedation, risks of missed lesions. The patient wishes to proceed.

## 2022-06-08 NOTE — ANESTHESIA PRE PROCEDURE
Department of Anesthesiology  Preprocedure Note       Name:  Elizabeth Bello   Age:  57 y.o.  :  1960                                          MRN:  [id number]         Date:  2022      Surgeon: Octavia Neville):  Yvonne Madera MD    Procedure: Procedure(s):  COLONOSCOPY -SLEEP APNEA  EGD    Medications prior to admission:   Prior to Admission medications    Medication Sig Start Date End Date Taking? Authorizing Provider   lisinopril (PRINIVIL;ZESTRIL) 5 MG tablet Take 5 mg by mouth daily  22   Historical Provider, MD   ergocalciferol (DRISDOL) 1.25 MG (42275 UT) capsule Take 1 capsule by mouth once a week 22   LEIGHTON Harris CNP   pantoprazole (PROTONIX) 20 MG tablet Take 1 tablet by mouth daily 22   LEIGHTON Harris CNP   montelukast (SINGULAIR) 10 MG tablet TAKE 1 TABLET BY MOUTH EVERY DAY 22   LEIGHTON Harris CNP   fluticasone (FLOVENT HFA) 220 MCG/ACT inhaler INHALE 1 PUFF INTO THE LUNGS DAILY AS NEEDED  Patient taking differently: Inhale 1 puff into the lungs 2 times daily INHALE 1 PUFF INTO THE LUNGS DAILY AS N 22   LEIGHTON Harris CNP   Multiple Vitamin (MULTIVITAMIN ADULT PO) Take by mouth daily    Historical Provider, MD   Evolocumab (REPATHA) 140 MG/ML SOSY Inject into the skin every 14 days     Historical Provider, MD   albuterol sulfate HFA (PROAIR HFA) 108 (90 Base) MCG/ACT inhaler Inhale 2 puffs into the lungs every 6 hours as needed for Wheezing    Historical Provider, MD   aspirin 81 MG chewable tablet Take 81 mg by mouth daily. Historical Provider, MD       Current medications:    Current Facility-Administered Medications   Medication Dose Route Frequency Provider Last Rate Last Admin    lidocaine PF 1 % injection 0.1 mL  0.1 mL IntraDERmal Once PRN Yvonne Madera MD           Allergies:     Allergies   Allergen Reactions    Sulfa Antibiotics Anaphylaxis    Amlodipine Other (See Comments)     headache    Atorvastatin Other (See Comments)     Severe muscle weakness and muscular pain (when on in the past per pt 7/24/19)    Bisoprolol Other (See Comments)     Extreme fatigue and bradycardia per pt    Ciprofloxacin Other (See Comments)     torn ligament     Meloxicam Other (See Comments)     \" felt like he had the flu\"    Metoprolol Other (See Comments)     fatigue       Problem List:    Patient Active Problem List   Diagnosis Code    Asthma J45.909    Hyperlipidemia E78.5    Hypertension I10    Aortic regurgitation I35.1    Murmur R01.1    Aortic valve replaced Z95.2    Sensorineural hearing loss, bilateral H90.3       Past Medical History:        Diagnosis Date    A-fib ([address]     for short time after first aortic valve replacement    Aortic regurgitation     Aortic valve replaced     Asthma     Hyperlipidemia     Hypertension     Kidney stone     Murmur     Sleep apnea     uses CPAP       Past Surgical History:        Procedure Laterality Date    ANTERIOR CRUCIATE LIGAMENT REPAIR Right     AORTIC VALVE REPLACEMENT  6/17/2014, 2019    X 2    KIDNEY STONE REMOVAL      NASAL SEPTUM SURGERY      POLYPECTOMY  2011    colon polyp    SHOULDER SURGERY Right        Social History:    Social History     Tobacco Use    Smoking status: Never Smoker    Smokeless tobacco: Never Used   Substance Use Topics    Alcohol use: Yes     Comment: 2-3 drinks per month                                Counseling given: Not Answered      Vital Signs (Current):   Vitals:    06/02/22 1552 06/08/22 0829   BP:  (!) 151/74   Pulse:  70   Resp:  16   Temp:  97 °F (36.1 °C)   SpO2:  98%   Weight: 190 lb (86.2 kg)    Height: 5' 8\" (1.727 m)                                               BP Readings from Last 3 Encounters:   06/08/22 (!) 151/74   05/17/22 138/82   01/17/22 130/82       NPO Status: Time of last liquid consumption: 0200                        Time of last solid consumption: 1800                        Date of last liquid consumption: 06/08/22                        Date of last solid food consumption: 06/06/22    BMI:   Wt Readings from Last 3 Encounters:   06/02/22 190 lb (86.2 kg)   05/17/22 192 lb (87.1 kg)   01/17/22 193 lb (87.5 kg)     Body mass index is 28.89 kg/m². CBC:   Lab Results   Component Value Date    WBC 7.3 01/18/2022    RBC 5.16 01/18/2022    HGB 16.3 01/18/2022    HCT 48.1 01/18/2022    MCV 93.2 01/18/2022    RDW 12.9 01/18/2022     01/18/2022       CMP:   Lab Results   Component Value Date     05/17/2022    K 4.5 05/17/2022     05/17/2022    CO2 25 05/17/2022    BUN 14 05/17/2022    CREATININE 1.0 05/17/2022    GFRAA >60 05/17/2022    GFRAA 59 08/07/2011    AGRATIO 1.8 05/17/2022    LABGLOM >60 05/17/2022    GLUCOSE 105 05/17/2022    PROT 6.9 05/17/2022    PROT 7.1 08/07/2011    CALCIUM 9.2 05/17/2022    BILITOT 0.5 05/17/2022    ALKPHOS 83 05/17/2022    AST 23 05/17/2022    ALT 36 05/17/2022       POC Tests: No results for input(s): POCGLU, POCNA, POCK, POCCL, POCBUN, POCHEMO, POCHCT in the last 72 hours.     Coags: No results found for: PROTIME, INR, APTT    HCG (If Applicable): No results found for: PREGTESTUR, PREGSERUM, HCG, HCGQUANT     ABGs: No results found for: PHART, PO2ART, QVS1OWE, PDV8NDT, BEART, D7THIAIX     Type & Screen (If Applicable):  No results found for: LABABO, LABRH    Drug/Infectious Status (If Applicable):  No results found for: HIV, HEPCAB    COVID-19 Screening (If Applicable): No results found for: COVID19        Anesthesia Evaluation  Patient summary reviewed and Nursing notes reviewed  Airway: Mallampati: IV  TM distance: >3 FB   Neck ROM: full  Mouth opening: < 3 FB   Dental: normal exam         Pulmonary:Negative Pulmonary ROS and normal exam  breath sounds clear to auscultation  (+) sleep apnea:  asthma:                            Cardiovascular:    (+) hypertension:, valvular problems/murmurs (s/p AVR): AI and AS, murmur: Grade 3,         Rhythm: regular  Rate: normal                    Neuro/Psych:   Negative Neuro/Psych ROS              GI/Hepatic/Renal: Neg GI/Hepatic/Renal ROS            Endo/Other: Negative Endo/Other ROS                    Abdominal:             Vascular: negative vascular ROS. Other Findings:           Anesthesia Plan      MAC     ASA 3       Induction: intravenous. Anesthetic plan and risks discussed with patient. Plan discussed with CRNA.                     Maryam Norton MD   6/8/2022

## 2022-09-19 ENCOUNTER — OFFICE VISIT (OUTPATIENT)
Dept: FAMILY MEDICINE CLINIC | Age: 62
End: 2022-09-19
Payer: COMMERCIAL

## 2022-09-19 VITALS
BODY MASS INDEX: 29.04 KG/M2 | OXYGEN SATURATION: 97 % | HEART RATE: 60 BPM | DIASTOLIC BLOOD PRESSURE: 86 MMHG | WEIGHT: 191 LBS | SYSTOLIC BLOOD PRESSURE: 136 MMHG

## 2022-09-19 DIAGNOSIS — E78.5 HYPERLIPIDEMIA, UNSPECIFIED HYPERLIPIDEMIA TYPE: ICD-10-CM

## 2022-09-19 DIAGNOSIS — K21.9 GASTROESOPHAGEAL REFLUX DISEASE, UNSPECIFIED WHETHER ESOPHAGITIS PRESENT: ICD-10-CM

## 2022-09-19 DIAGNOSIS — R79.89 LOW VITAMIN D LEVEL: ICD-10-CM

## 2022-09-19 DIAGNOSIS — J45.20 MILD INTERMITTENT ASTHMA WITHOUT COMPLICATION: ICD-10-CM

## 2022-09-19 DIAGNOSIS — I10 ESSENTIAL HYPERTENSION: Primary | ICD-10-CM

## 2022-09-19 LAB
BASOPHILS ABSOLUTE: 0.1 K/UL (ref 0–0.2)
BASOPHILS RELATIVE PERCENT: 1.2 %
CHOLESTEROL, TOTAL: 124 MG/DL (ref 0–199)
EOSINOPHILS ABSOLUTE: 0.2 K/UL (ref 0–0.6)
EOSINOPHILS RELATIVE PERCENT: 3 %
HCT VFR BLD CALC: 48.2 % (ref 40.5–52.5)
HDLC SERPL-MCNC: 38 MG/DL (ref 40–60)
HEMOGLOBIN: 16.2 G/DL (ref 13.5–17.5)
LDL CHOLESTEROL CALCULATED: 46 MG/DL
LYMPHOCYTES ABSOLUTE: 1.7 K/UL (ref 1–5.1)
LYMPHOCYTES RELATIVE PERCENT: 21.1 %
MCH RBC QN AUTO: 31.5 PG (ref 26–34)
MCHC RBC AUTO-ENTMCNC: 33.6 G/DL (ref 31–36)
MCV RBC AUTO: 93.8 FL (ref 80–100)
MONOCYTES ABSOLUTE: 0.7 K/UL (ref 0–1.3)
MONOCYTES RELATIVE PERCENT: 8 %
NEUTROPHILS ABSOLUTE: 5.4 K/UL (ref 1.7–7.7)
NEUTROPHILS RELATIVE PERCENT: 66.7 %
PDW BLD-RTO: 13.2 % (ref 12.4–15.4)
PLATELET # BLD: 276 K/UL (ref 135–450)
PMV BLD AUTO: 8.6 FL (ref 5–10.5)
RBC # BLD: 5.14 M/UL (ref 4.2–5.9)
TRIGL SERPL-MCNC: 200 MG/DL (ref 0–150)
VITAMIN D 25-HYDROXY: 42 NG/ML
VLDLC SERPL CALC-MCNC: 40 MG/DL
WBC # BLD: 8.1 K/UL (ref 4–11)

## 2022-09-19 PROCEDURE — 99214 OFFICE O/P EST MOD 30 MIN: CPT

## 2022-09-19 ASSESSMENT — ENCOUNTER SYMPTOMS
RESPIRATORY NEGATIVE: 1
GASTROINTESTINAL NEGATIVE: 1

## 2022-09-19 NOTE — PROGRESS NOTES
Spouse name: Not on file    Number of children: Not on file    Years of education: Not on file    Highest education level: Not on file   Occupational History    Not on file   Tobacco Use    Smoking status: Never    Smokeless tobacco: Never   Substance and Sexual Activity    Alcohol use: Yes     Comment: 2-3 drinks per month    Drug use: No    Sexual activity: Not on file   Other Topics Concern    Not on file   Social History Narrative    Not on file     Social Determinants of Health     Financial Resource Strain: Not on file   Food Insecurity: Not on file   Transportation Needs: Not on file   Physical Activity: Not on file   Stress: Not on file   Social Connections: Not on file   Intimate Partner Violence: Not on file   Housing Stability: Not on file       Prior to Visit Medications    Medication Sig Taking? Authorizing Provider   lisinopril (PRINIVIL;ZESTRIL) 20 MG tablet Take 20 mg by mouth daily Yes Historical Provider, MD   ergocalciferol (DRISDOL) 1.25 MG (56430 UT) capsule Take 1 capsule by mouth once a week Yes LEIGHTON Soares CNP   pantoprazole (PROTONIX) 20 MG tablet Take 1 tablet by mouth daily Yes LEIGHTON Soares CNP   montelukast (SINGULAIR) 10 MG tablet TAKE 1 TABLET BY MOUTH EVERY DAY Yes LEIGHTON Soares CNP   fluticasone (FLOVENT HFA) 220 MCG/ACT inhaler INHALE 1 PUFF INTO THE LUNGS DAILY AS NEEDED  Patient taking differently: Inhale 1 puff into the lungs 2 times daily INHALE 1 PUFF INTO THE LUNGS DAILY AS N Yes LEIGHTON Soares CNP   Multiple Vitamin (MULTIVITAMIN ADULT PO) Take by mouth daily Yes Historical Provider, MD   Evolocumab (REPATHA) 140 MG/ML SOSY Inject into the skin every 14 days  Yes Historical Provider, MD   albuterol sulfate HFA (PROVENTIL;VENTOLIN;PROAIR) 108 (90 Base) MCG/ACT inhaler Inhale 2 puffs into the lungs every 6 hours as needed for Wheezing Yes Historical Provider, MD   aspirin 81 MG chewable tablet Take 81 mg by mouth daily.  Yes Historical Provider, MD       Allergies   Allergen Reactions    Sulfa Antibiotics Anaphylaxis    Amlodipine Other (See Comments)     headache    Atorvastatin Other (See Comments)     Severe muscle weakness and muscular pain (when on in the past per pt 7/24/19)    Bisoprolol Other (See Comments)     Extreme fatigue and bradycardia per pt    Ciprofloxacin Other (See Comments)     torn ligament     Meloxicam Other (See Comments)     \" felt like he had the flu\"    Metoprolol Other (See Comments)     fatigue       OBJECTIVE:    /86 (Site: Right Upper Arm, Position: Sitting)   Pulse 60   Wt 191 lb (86.6 kg)   SpO2 97%   BMI 29.04 kg/m²     BP Readings from Last 2 Encounters:   09/19/22 136/86   06/08/22 132/71       Wt Readings from Last 3 Encounters:   09/19/22 191 lb (86.6 kg)   06/02/22 190 lb (86.2 kg)   05/17/22 192 lb (87.1 kg)       Physical Exam  Constitutional:       Appearance: Normal appearance. HENT:      Head: Normocephalic and atraumatic. Eyes:      Extraocular Movements: Extraocular movements intact. Pupils: Pupils are equal, round, and reactive to light. Cardiovascular:      Rate and Rhythm: Normal rate and regular rhythm. Pulses: Normal pulses. Heart sounds: Murmur heard. Pulmonary:      Effort: Pulmonary effort is normal.      Breath sounds: Normal breath sounds. No wheezing. Abdominal:      General: Bowel sounds are normal.   Musculoskeletal:         General: No swelling. Normal range of motion. Right lower leg: No edema. Left lower leg: No edema. Skin:     General: Skin is warm. Capillary Refill: Capillary refill takes less than 2 seconds. Neurological:      General: No focal deficit present. Mental Status: He is alert and oriented to person, place, and time. Psychiatric:         Mood and Affect: Mood normal.         Behavior: Behavior normal.         ASSESSMENT/PLAN:    1. Essential hypertension  BP normal today at 136/86.   Repeat labs Detail Level: Detailed ordered today. Patient following NIKOLAY Consuelo Schroeder for blood pressure medication maintenance. - LIPID PANEL  - CBC with Auto Differential    2. Hyperlipidemia, unspecified hyperlipidemia type  Repeat labs ordered today. - LIPID PANEL  - CBC with Auto Differential    3. Low vitamin D level  Repeat labs ordered today. - Vitamin D 25 Hydroxy    4. Gastroesophageal reflux disease, unspecified whether esophagitis present  Controlled on current medication regimen at this time. 5. Mild intermittent asthma without complication  Controlled on current medication regimen at this time. 30 minutes spent on patient care today. Patient Clines flu vaccine at this time. States will come back in with his wife and have performed together. This document was prepared by a combination of typing and transcription through a voice recognition software.     LEIGHTON Han - CNP Quality 130: Documentation Of Current Medications In The Medical Record: Current Medications Documented Quality 402: Tobacco Use And Help With Quitting Among Adolescents: Patient screened for tobacco and is an ex-smoker Quality 110: Preventive Care And Screening: Influenza Immunization: Influenza Immunization Administered during Influenza season

## 2022-12-28 ENCOUNTER — OFFICE VISIT (OUTPATIENT)
Dept: FAMILY MEDICINE CLINIC | Age: 62
End: 2022-12-28
Payer: COMMERCIAL

## 2022-12-28 VITALS
SYSTOLIC BLOOD PRESSURE: 138 MMHG | WEIGHT: 194.2 LBS | HEIGHT: 68 IN | OXYGEN SATURATION: 98 % | BODY MASS INDEX: 29.43 KG/M2 | HEART RATE: 62 BPM | DIASTOLIC BLOOD PRESSURE: 82 MMHG

## 2022-12-28 DIAGNOSIS — S60.811A ABRASION OF RIGHT WRIST, INITIAL ENCOUNTER: ICD-10-CM

## 2022-12-28 DIAGNOSIS — S80.02XA CONTUSION OF LEFT KNEE, INITIAL ENCOUNTER: ICD-10-CM

## 2022-12-28 DIAGNOSIS — M77.02 MEDIAL EPICONDYLITIS OF LEFT ELBOW: Primary | ICD-10-CM

## 2022-12-28 PROCEDURE — 99213 OFFICE O/P EST LOW 20 MIN: CPT | Performed by: FAMILY MEDICINE

## 2022-12-28 PROCEDURE — 3078F DIAST BP <80 MM HG: CPT | Performed by: FAMILY MEDICINE

## 2022-12-28 PROCEDURE — 3074F SYST BP LT 130 MM HG: CPT | Performed by: FAMILY MEDICINE

## 2022-12-28 RX ORDER — BISOPROLOL FUMARATE AND HYDROCHLOROTHIAZIDE 2.5; 6.25 MG/1; MG/1
1 TABLET ORAL DAILY
COMMUNITY
Start: 2022-11-28

## 2022-12-28 ASSESSMENT — ENCOUNTER SYMPTOMS: SHORTNESS OF BREATH: 0

## 2022-12-28 NOTE — PROGRESS NOTES
Chief Complaint   Patient presents with    Elbow Pain     Left elbow pain after falling       HPI:  Cm Gillespie is a 60 y.o. (: 1960) here today   for left elbow pain after falling on Saturday night. HPI  Has had pain over past 6 weeks or so. Had been doing some painting, wood working, others. Both was hurting. Thought overuse. Right side improved. Had fall on Saturday. Was working on water (had line burst). Fell face forward. Hit left knee. Abrasion right wrist.  Since that time, sig pain to left elbow. Pain w/ writing, lifting. No sig pain w/ rom testing. Left handed. Unsure of any direct trauma to elbow. Mild limping due to knee pain. No locking or give way weakness noted. Hx of AVR. Now on bisoprolol/hctz. Nataly well. Helping. Nataly well. Off lisin. Patient's medications, allergies, past medical, surgical, social and family histories were reviewed and updated asappropriate. ROS:  Review of Systems   Constitutional:  Negative for fever. Respiratory:  Negative for shortness of breath. Musculoskeletal:  Positive for arthralgias. Prior to Visit Medications    Medication Sig Taking?  Authorizing Provider   bisoprolol-hydroCHLOROthiazide (ZIAC) 2.5-6.25 MG per tablet Take 1 tablet by mouth daily Yes Historical Provider, MD   diclofenac sodium (VOLTAREN) 1 % GEL Apply 2 g topically 4 times daily Yes Lisa Aldana MD   ergocalciferol (DRISDOL) 1.25 MG (18626 UT) capsule Take 1 capsule by mouth once a week Yes LEIGHTON Rivas CNP   pantoprazole (PROTONIX) 20 MG tablet Take 1 tablet by mouth daily Yes LEIGHTON Rivas CNP   montelukast (SINGULAIR) 10 MG tablet TAKE 1 TABLET BY MOUTH EVERY DAY Yes LEIGHTON Rivas CNP   fluticasone (FLOVENT HFA) 220 MCG/ACT inhaler INHALE 1 PUFF INTO THE LUNGS DAILY AS NEEDED  Patient taking differently: Inhale 1 puff into the lungs 2 times daily INHALE 1 PUFF INTO THE LUNGS DAILY AS N Yes Ian Beth APRN - CNP   Evolocumab (REPATHA) 140 MG/ML SOSY Inject into the skin every 14 days  Yes Historical Provider, MD   albuterol sulfate HFA (PROVENTIL;VENTOLIN;PROAIR) 108 (90 Base) MCG/ACT inhaler Inhale 2 puffs into the lungs every 6 hours as needed for Wheezing Yes Historical Provider, MD   aspirin 81 MG chewable tablet Take 81 mg by mouth daily. Yes Historical Provider, MD       Allergies   Allergen Reactions    Sulfa Antibiotics Anaphylaxis    Amlodipine Other (See Comments)     headache    Atorvastatin Other (See Comments)     Severe muscle weakness and muscular pain (when on in the past per pt 7/24/19)    Bisoprolol Other (See Comments)     Extreme fatigue and bradycardia per pt    Ciprofloxacin Other (See Comments)     torn ligament     Meloxicam Other (See Comments)     \" felt like he had the flu\"    Metoprolol Other (See Comments)     fatigue       OBJECTIVE:    /82   Pulse 62   Ht 5' 8\" (1.727 m)   Wt 194 lb 3.2 oz (88.1 kg)   SpO2 98%   BMI 29.53 kg/m²     BP Readings from Last 2 Encounters:   12/28/22 138/82   09/19/22 136/86       Wt Readings from Last 3 Encounters:   12/28/22 194 lb 3.2 oz (88.1 kg)   09/19/22 191 lb (86.6 kg)   06/02/22 190 lb (86.2 kg)       Physical Exam  Constitutional:       Appearance: Normal appearance. HENT:      Head: Normocephalic and atraumatic. Eyes:      Extraocular Movements: Extraocular movements intact. Cardiovascular:      Rate and Rhythm: Normal rate and regular rhythm. Heart sounds: Murmur heard. Systolic murmur is present with a grade of 2/6. Pulmonary:      Effort: Pulmonary effort is normal.      Breath sounds: Normal breath sounds. Abdominal:      Palpations: Abdomen is soft. Tenderness: There is no abdominal tenderness. Musculoskeletal:        Arms:         Hands:       Left knee: Bony tenderness present. Tenderness present. No medial joint line or lateral joint line tenderness. Right lower leg: No edema.       Left lower leg: No edema. Legs:    Skin:     General: Skin is warm and dry. Neurological:      General: No focal deficit present. Mental Status: He is alert and oriented to person, place, and time. Psychiatric:         Mood and Affect: Mood normal.         Behavior: Behavior normal.         ASSESSMENT/PLAN:     1. Medial epicondylitis of left elbow  Suspected etiology to symptoms. Likely aggravated by recent overuse, then further exacerbated with recent fall. No obvious traumatic injury noted. Given exercises. Trial of topical anti-inflammatory. The patient had used Mobic in the past, with some question regarding potential side effects. He does state he initially did well with this medication, but then developed side effects that may have been related to Mobic, but may have been related to another medication. Topical anti-inflammatories as listed. Discussed ice. Discussed rest and brace. Call if symptoms fail to improve  - diclofenac sodium (VOLTAREN) 1 % GEL; Apply 2 g topically 4 times daily  Dispense: 350 g; Refill: 0    2. Contusion of left knee, initial encounter  Suspect this is simply a contusion. Order for x-ray only to be done if symptoms worsen or fail to improve  - XR KNEE LEFT (MIN 4 VIEWS); Future    3. Abrasion of right wrist, initial encounter  Normal range of motion. No evidence of infection.   Monitor for now    Routine follow-up as planned in approximately 6 weeks

## 2023-01-11 ENCOUNTER — TELEMEDICINE (OUTPATIENT)
Dept: FAMILY MEDICINE CLINIC | Age: 63
End: 2023-01-11
Payer: COMMERCIAL

## 2023-01-11 DIAGNOSIS — R05.9 COUGH, UNSPECIFIED TYPE: ICD-10-CM

## 2023-01-11 DIAGNOSIS — R06.2 WHEEZING: ICD-10-CM

## 2023-01-11 DIAGNOSIS — R09.89 CHEST CONGESTION: ICD-10-CM

## 2023-01-11 DIAGNOSIS — J40 BRONCHITIS: Primary | ICD-10-CM

## 2023-01-11 PROCEDURE — 99214 OFFICE O/P EST MOD 30 MIN: CPT

## 2023-01-11 RX ORDER — METHYLPREDNISOLONE 4 MG/1
TABLET ORAL
Qty: 1 KIT | Refills: 0 | Status: SHIPPED | OUTPATIENT
Start: 2023-01-11 | End: 2023-01-17

## 2023-01-11 RX ORDER — AZITHROMYCIN 250 MG/1
250 TABLET, FILM COATED ORAL SEE ADMIN INSTRUCTIONS
Qty: 6 TABLET | Refills: 0 | Status: SHIPPED | OUTPATIENT
Start: 2023-01-11 | End: 2023-01-16

## 2023-01-11 ASSESSMENT — ENCOUNTER SYMPTOMS
CHEST TIGHTNESS: 0
SHORTNESS OF BREATH: 1
SORE THROAT: 0
GASTROINTESTINAL NEGATIVE: 1
SINUS PAIN: 0
WHEEZING: 1
SINUS PRESSURE: 0
COUGH: 1

## 2023-01-11 ASSESSMENT — PATIENT HEALTH QUESTIONNAIRE - PHQ9
SUM OF ALL RESPONSES TO PHQ9 QUESTIONS 1 & 2: 0
SUM OF ALL RESPONSES TO PHQ QUESTIONS 1-9: 0
2. FEELING DOWN, DEPRESSED OR HOPELESS: 0
SUM OF ALL RESPONSES TO PHQ QUESTIONS 1-9: 0
1. LITTLE INTEREST OR PLEASURE IN DOING THINGS: 0

## 2023-01-11 NOTE — PROGRESS NOTES
2023    TELEHEALTH EVALUATION -- Audio/Visual (During CNNWL-37 public health emergency)    HPI:    Patient evaluated today through video visit for ongoing respiratory symptoms. Patient states for the last x10 days has been having cough, chest congestion. Over the last several days has started to develop some wheezing and shortness of breath. Does have history of asthma. Wife had similar symptoms prior to him but got better. States he is not getting better. Denies fever. Does have phlegm in chest with his cough but is unable to fully expel phlegm to determine if abnormal in color. Patient taking Mucinex DM and Tylenol. Having some headache with forceful cough. Is using his inhalers as prescribed which do help with symptoms. Is also on Singulair which he is taking daily. Janet Whaley (:  1960) has requested an audio/video evaluation for the following concern(s):      Review of Systems   Constitutional: Negative. HENT:  Positive for congestion. Negative for sinus pressure, sinus pain and sore throat. Respiratory:  Positive for cough, shortness of breath and wheezing. Negative for chest tightness. Cardiovascular: Negative. Gastrointestinal: Negative. Musculoskeletal: Negative. Skin: Negative. Neurological: Negative. Psychiatric/Behavioral: Negative. Prior to Visit Medications    Medication Sig Taking? Authorizing Provider   azithromycin (ZITHROMAX) 250 MG tablet Take 1 tablet by mouth See Admin Instructions for 5 days 500mg on day 1 followed by 250mg on days 2 - 5 Yes LEIGHTON Fuentes - CNP   methylPREDNISolone (MEDROL DOSEPACK) 4 MG tablet Take by mouth.  Yes LEIGHTON Fuentes - CNP   bisoprolol-hydroCHLOROthiazide [facility]) 2.5-6.25 MG per tablet Take 1 tablet by mouth daily  Historical Provider, MD   diclofenac sodium (VOLTAREN) 1 % GEL Apply 2 g topically 4 times daily  Giovanni Baumgarten, MD   ergocalciferol (DRISDOL) 1.25 MG (62706 UT) capsule Take 1 capsule by mouth once a week  LEIGHTON Varner CNP   pantoprazole (PROTONIX) 20 MG tablet Take 1 tablet by mouth daily  LEIGHTON Varner CNP   montelukast (SINGULAIR) 10 MG tablet TAKE 1 TABLET BY MOUTH EVERY DAY  LEIGHTON Varner CNP   fluticasone (FLOVENT HFA) 220 MCG/ACT inhaler INHALE 1 PUFF INTO THE LUNGS DAILY AS NEEDED  Patient taking differently: Inhale 1 puff into the lungs 2 times daily INHALE 1 PUFF INTO THE LUNGS DAILY AS N  LEIGHTON Varner CNP   Evolocumab (REPATHA) 140 MG/ML SOSY Inject into the skin every 14 days   Historical Provider, MD   albuterol sulfate HFA (PROVENTIL;VENTOLIN;PROAIR) 108 (90 Base) MCG/ACT inhaler Inhale 2 puffs into the lungs every 6 hours as needed for Wheezing  Historical Provider, MD   aspirin 81 MG chewable tablet Take 81 mg by mouth daily.   Historical Provider, MD       Social History     Tobacco Use    Smoking status: Never    Smokeless tobacco: Never   Substance Use Topics    Alcohol use: Yes     Comment: 2-3 drinks per month    Drug use: No        Allergies   Allergen Reactions    Sulfa Antibiotics Anaphylaxis    Amlodipine Other (See Comments)     headache    Atorvastatin Other (See Comments)     Severe muscle weakness and muscular pain (when on in the past per pt 7/24/19)    Bisoprolol Other (See Comments)     Extreme fatigue and bradycardia per pt    Ciprofloxacin Other (See Comments)     torn ligament     Meloxicam Other (See Comments)     \" felt like he had the flu\"    Metoprolol Other (See Comments)     fatigue   ,   Past Medical History:   Diagnosis Date    [facility]     for short time after first aortic valve replacement    Aortic regurgitation     Aortic valve replaced     Asthma     Hyperlipidemia     Hypertension     Kidney stone     Murmur     Sleep apnea     uses CPAP       PHYSICAL EXAMINATION:  [ INSTRUCTIONS:  \"[x]\" Indicates a positive item  \"[]\" Indicates a negative item  -- DELETE ALL ITEMS NOT EXAMINED]  Vital Signs: (As obtained by patient/caregiver or practitioner observation)    Blood pressure-N/A heart rate-N/A   respiratory rate-N/A   temperature-N/A pulse oximetry-N/A    Constitutional: [x] Appears well-developed and well-nourished [x] No apparent distress      [] Abnormal-   Mental status  [x] Alert and awake  [x] Oriented to person/place/time [x]Able to follow commands      Eyes:  EOM    [x]  Normal  [] Abnormal-  Sclera  [x]  Normal  [] Abnormal -         Discharge [x]  None visible  [] Abnormal -    HENT:   [x] Normocephalic, atraumatic. [] Abnormal   [x] Mouth/Throat: Mucous membranes are moist.     External Ears [x] Normal  [] Abnormal-     Neck: [x] No visualized mass     Pulmonary/Chest: [x] Respiratory effort normal.  [x] No visualized signs of difficulty breathing or respiratory distress        [] Abnormal-      Musculoskeletal:   [] Normal gait with no signs of ataxia         [x] Normal range of motion of neck        [] Abnormal-       Neurological:        [x] No Facial Asymmetry (Cranial nerve 7 motor function) (limited exam to video visit)          [] No gaze palsy        [] Abnormal-         Skin:        [] No significant exanthematous lesions or discoloration noted on facial skin         [] Abnormal-            Psychiatric:       [x] Normal Affect [] No Hallucinations        [] Abnormal-     Other pertinent observable physical exam findings-patient had a frequent deep sounding cough during exam today    Due to this being a TeleHealth encounter, evaluation of the following organ systems is limited: Vitals/Constitutional/EENT/Resp/CV/GI//MS/Neuro/Skin/Heme-Lymph-Imm. ASSESSMENT/PLAN:  1. Bronchitis  See above HPI for patient symptoms and onset. Given patient's duration of symptoms as well as physical exam today through video visit I do suspect patient symptoms are related to bronchitis. We will treat the patient at this time with anthramycin and Medrol Dosepak.   Encouraged the patient to continue with his inhalers as ordered.  Patient is an asthmatic therefore more prone to worsening symptoms.  Patient declined chest x-ray at this time but states would notify the office if his symptoms fail to improve or worsen with treatment plan.  I reviewed patient most recent labs and kidney function is stable for treatment plan prescribed.  - azithromycin (ZITHROMAX) 250 MG tablet; Take 1 tablet by mouth See Admin Instructions for 5 days 500mg on day 1 followed by 250mg on days 2 - 5  Dispense: 6 tablet; Refill: 0  - methylPREDNISolone (MEDROL DOSEPACK) 4 MG tablet; Take by mouth.  Dispense: 1 kit; Refill: 0    2. Chest congestion  See above #1  - azithromycin (ZITHROMAX) 250 MG tablet; Take 1 tablet by mouth See Admin Instructions for 5 days 500mg on day 1 followed by 250mg on days 2 - 5  Dispense: 6 tablet; Refill: 0    3. Cough, unspecified type  See above #1  - azithromycin (ZITHROMAX) 250 MG tablet; Take 1 tablet by mouth See Admin Instructions for 5 days 500mg on day 1 followed by 250mg on days 2 - 5  Dispense: 6 tablet; Refill: 0    4. Wheezing  See above #1.  Reviewed patient chart and has stable kidney function.  - methylPREDNISolone (MEDROL DOSEPACK) 4 MG tablet; Take by mouth.  Dispense: 1 kit; Refill: 0    No follow-ups on file.    An  electronic signature was used to authenticate this note.    30 minutes spent on patient care today    This document was prepared by a combination of typing and transcription through a voice recognition software.    --Manuel Araujo, LEIGHTON - CNP on 1/11/2023 at 3:31 PM        Pursuant to the emergency declaration under the [place] Act and the National Emergencies Act, 1135 waiver authority and the Coronavirus Preparedness and Response Supplemental Appropriations Act, this Virtual  Visit was conducted, with patient's consent, to reduce the patient's risk of exposure to COVID-19 and provide continuity of care for an established patient.    Services were provided through a video  synchronous discussion virtually to substitute for in-person clinic visit.

## 2023-02-03 NOTE — PROGRESS NOTES
Chief Complaint   Patient presents with    Hypertension    Knee Pain     Left knee pain from fall 22       HPI:  Ayde Flanagan is a 60 y.o. (: 1960) here today   for 4-month checkup. Hypertension: was taken off of bisoprolol-hydrochlorothiazide 2.5-6.25 mg daily by NIKOLAY Oliveira. Is now on Losartan 50mg daily. Sees  in . Is getting a cardiac CT on 23. Hyperlipidemia: On Repatha. A-fib: On aspirin 81 mg daily. Following cardiology NIKOLAY Oliveira. Asthma: Has albuterol inhaler, Flovent, Singulair    On  1/15/23 was seen in ER for cut to left thumb he sustained using table saw blade. Saw ortho at [facility] and they left the 5 stitches in that were placed in the ED. Then saw Ortho hand specialist through  at [facility] and he took stiches out. Pt still has concerns about area with some bleeding and open spots. [place] 22 and hit left knee. Saw Marielena Goss who ordered XRay. Pt never had imaging done. Still with swelling above left patella. Hurts to run on left knee. Care gaps addressed today            Patient's medications, allergies, past medical, surgical, social and family histories were reviewed and updated as appropriate. ROS:  Review of Systems   Constitutional: Negative. HENT: Negative. Respiratory: Negative. Cardiovascular: Negative. Gastrointestinal: Negative. Musculoskeletal:  Positive for arthralgias. Skin:         Healing left thumb injury   Neurological: Negative. Psychiatric/Behavioral: Negative.            Hemoglobin A1C (%)   Date Value   2021 5.5     LDL Calculated (mg/dL)   Date Value   2022 46       Past Medical History:   Diagnosis Date    A-fib [facility])     for short time after first aortic valve replacement    Aortic regurgitation     Aortic valve replaced     Asthma     Hyperlipidemia     Hypertension     Kidney stone     Murmur     Sleep apnea     uses CPAP       Family History   Problem Relation Age of Onset    Cancer Mother         breast    Heart Failure Father     Hearing Loss Father        Social History     Socioeconomic History    Marital status:      Spouse name: Not on file    Number of children: Not on file    Years of education: Not on file    Highest education level: Not on file   Occupational History    Not on file   Tobacco Use    Smoking status: Never    Smokeless tobacco: Never   Substance and Sexual Activity    Alcohol use: Yes     Comment: 2-3 drinks per month    Drug use: No    Sexual activity: Not on file   Other Topics Concern    Not on file   Social History Narrative    Not on file     Social Determinants of Health     Financial Resource Strain: Not on file   Food Insecurity: Not on file   Transportation Needs: Not on file   Physical Activity: Not on file   Stress: Not on file   Social Connections: Not on file   Intimate Partner Violence: Not on file   Housing Stability: Not on file       Prior to Visit Medications    Medication Sig Taking? Authorizing Provider   diclofenac sodium (VOLTAREN) 1 % GEL Apply 2 g topically 4 times daily Yes Chema Ozuna MD   ergocalciferol (DRISDOL) 1.25 MG (75214 UT) capsule Take 1 capsule by mouth once a week Yes LEIGHTON Schwartz CNP   pantoprazole (PROTONIX) 20 MG tablet Take 1 tablet by mouth daily Yes LEIGHTON Schwartz CNP   montelukast (SINGULAIR) 10 MG tablet TAKE 1 TABLET BY MOUTH EVERY DAY Yes LEIGHTON Schwartz CNP   fluticasone (FLOVENT HFA) 220 MCG/ACT inhaler INHALE 1 PUFF INTO THE LUNGS DAILY AS NEEDED  Patient taking differently: Inhale 1 puff into the lungs 2 times daily INHALE 1 PUFF INTO THE LUNGS DAILY AS N Yes LEIGHTON Schwartz CNP   Evolocumab (REPATHA) 140 MG/ML SOSY Inject into the skin every 14 days  Yes Historical Provider, MD   albuterol sulfate HFA (PROVENTIL;VENTOLIN;PROAIR) 108 (90 Base) MCG/ACT inhaler Inhale 2 puffs into the lungs every 6 hours as needed for Wheezing Yes Historical Provider, MD  aspirin 81 MG chewable tablet Take 81 mg by mouth daily. Yes Historical Provider, MD   losartan (COZAAR) 50 MG tablet Take 50 mg by mouth daily  Historical Provider, MD       Allergies   Allergen Reactions    Sulfa Antibiotics Anaphylaxis    Amlodipine Other (See Comments)     headache    Atorvastatin Other (See Comments)     Severe muscle weakness and muscular pain (when on in the past per pt 7/24/19)    Bisoprolol Other (See Comments)     Extreme fatigue and bradycardia per pt    Ciprofloxacin Other (See Comments)     torn ligament     Meloxicam Other (See Comments)     \" felt like he had the flu\"    Metoprolol Other (See Comments)     fatigue       OBJECTIVE:    /80   Pulse 80   Wt 192 lb 12.8 oz (87.5 kg)   SpO2 98%   BMI 29.32 kg/m²     BP Readings from Last 2 Encounters:   02/06/23 132/80   12/28/22 138/82       Wt Readings from Last 3 Encounters:   02/06/23 192 lb 12.8 oz (87.5 kg)   12/28/22 194 lb 3.2 oz (88.1 kg)   09/19/22 191 lb (86.6 kg)       Physical Exam  Constitutional:       Appearance: Normal appearance. HENT:      Head: Normocephalic and atraumatic. Cardiovascular:      Rate and Rhythm: Normal rate and regular rhythm. Pulses: Normal pulses. Heart sounds: Normal heart sounds. No murmur heard. Pulmonary:      Effort: Pulmonary effort is normal.      Breath sounds: Normal breath sounds. No wheezing. Abdominal:      General: Bowel sounds are normal.   Musculoskeletal:         General: Swelling, tenderness and signs of injury present. Skin:     General: Skin is warm. Capillary Refill: Capillary refill takes less than 2 seconds. Comments: Healing injury to left thumb   Neurological:      General: No focal deficit present. Mental Status: He is alert. Psychiatric:         Mood and Affect: Mood normal.         Behavior: Behavior normal.         ASSESSMENT/PLAN:    1. Essential hypertension  BP stable today at 132/80.   Patient will continue on current treatment plan as ordered. Patient advised to ensure follows up with cardiology as recommended. 2. Hyperlipidemia, unspecified hyperlipidemia type  Repeat labs ordered today. Continue on current treatment plan as ordered. - LIPID PANEL    3. Mild intermittent asthma without complication  Doing well at this time. No changes to medication regimen at this time. 4. Atrial fibrillation, unspecified type [facility])  Following cardiology CNP Juan Pablo Collier. Repeat labs ordered today. - Comprehensive Metabolic Panel  - CBC with Auto Differential    5. Injury of left thumb, sequela  See picture inserted above, see above HPI. I recommended the patient at this time keep dressing over top of left thumb when out side of his home either working or out in public in general.  While at home I recommended the patient leave open to air. Advised cleaning with soap and water but advised against scrubbing the affected area. Does not appear to be infected at this time. Do not think antibiotics are warranted. Advised patient to monitor area and contact the office if has any further concerns for infection. 6. Acute pain of left knee  See above HPI. Patient has noted effusion to left knee above his patella. Recommend the patient use compression/ice daily until he sees orthopedic specialist.  Do believe will most likely need needle aspiration to reduce effusion. Patient will be sent to [facility] to have x-ray imaging that was ordered by Dr. Cristal Dumont to ensure there are no other abnormalities on x-ray given patient ongoing symptoms.  - [address] [place] [address], DO [place], Orthopedics and Sports Medicine (Hip; Knee; Shoulder), [place]    7. Vitamin D deficiency  Drawn today  - Vitamin D 25 Hydroxy      35 min spent on pt care. This document was prepared by a combination of typing and transcription through a voice recognition software.     LEIGHTON Bledsoe - CNP

## 2023-02-05 SDOH — ECONOMIC STABILITY: FOOD INSECURITY: WITHIN THE PAST 12 MONTHS, YOU WORRIED THAT YOUR FOOD WOULD RUN OUT BEFORE YOU GOT MONEY TO BUY MORE.: NEVER TRUE

## 2023-02-05 SDOH — ECONOMIC STABILITY: FOOD INSECURITY: WITHIN THE PAST 12 MONTHS, THE FOOD YOU BOUGHT JUST DIDN'T LAST AND YOU DIDN'T HAVE MONEY TO GET MORE.: NEVER TRUE

## 2023-02-05 SDOH — ECONOMIC STABILITY: HOUSING INSECURITY
IN THE LAST 12 MONTHS, WAS THERE A TIME WHEN YOU DID NOT HAVE A STEADY PLACE TO SLEEP OR SLEPT IN A SHELTER (INCLUDING NOW)?: NO

## 2023-02-05 SDOH — ECONOMIC STABILITY: INCOME INSECURITY: HOW HARD IS IT FOR YOU TO PAY FOR THE VERY BASICS LIKE FOOD, HOUSING, MEDICAL CARE, AND HEATING?: NOT HARD AT ALL

## 2023-02-05 SDOH — ECONOMIC STABILITY: TRANSPORTATION INSECURITY
IN THE PAST 12 MONTHS, HAS LACK OF TRANSPORTATION KEPT YOU FROM MEETINGS, WORK, OR FROM GETTING THINGS NEEDED FOR DAILY LIVING?: NO

## 2023-02-06 ENCOUNTER — OFFICE VISIT (OUTPATIENT)
Dept: FAMILY MEDICINE CLINIC | Age: 63
End: 2023-02-06
Payer: COMMERCIAL

## 2023-02-06 VITALS
DIASTOLIC BLOOD PRESSURE: 80 MMHG | OXYGEN SATURATION: 98 % | BODY MASS INDEX: 29.32 KG/M2 | WEIGHT: 192.8 LBS | HEART RATE: 80 BPM | SYSTOLIC BLOOD PRESSURE: 132 MMHG

## 2023-02-06 DIAGNOSIS — S69.92XS INJURY OF LEFT THUMB, SEQUELA: ICD-10-CM

## 2023-02-06 DIAGNOSIS — I48.91 ATRIAL FIBRILLATION, UNSPECIFIED TYPE (HCC): ICD-10-CM

## 2023-02-06 DIAGNOSIS — J45.20 MILD INTERMITTENT ASTHMA WITHOUT COMPLICATION: ICD-10-CM

## 2023-02-06 DIAGNOSIS — M25.562 ACUTE PAIN OF LEFT KNEE: ICD-10-CM

## 2023-02-06 DIAGNOSIS — E55.9 VITAMIN D DEFICIENCY: ICD-10-CM

## 2023-02-06 DIAGNOSIS — I10 ESSENTIAL HYPERTENSION: Primary | ICD-10-CM

## 2023-02-06 DIAGNOSIS — E78.5 HYPERLIPIDEMIA, UNSPECIFIED HYPERLIPIDEMIA TYPE: ICD-10-CM

## 2023-02-06 LAB
A/G RATIO: 1.6 (ref 1.1–2.2)
ALBUMIN SERPL-MCNC: 4.3 G/DL (ref 3.4–5)
ALP BLD-CCNC: 76 U/L (ref 40–129)
ALT SERPL-CCNC: 30 U/L (ref 10–40)
ANION GAP SERPL CALCULATED.3IONS-SCNC: 14 MMOL/L (ref 3–16)
AST SERPL-CCNC: 22 U/L (ref 15–37)
BANDED NEUTROPHILS RELATIVE PERCENT: 3 % (ref 0–7)
BASOPHILS ABSOLUTE: 0.1 K/UL (ref 0–0.2)
BASOPHILS RELATIVE PERCENT: 1 %
BILIRUB SERPL-MCNC: 0.6 MG/DL (ref 0–1)
BUN BLDV-MCNC: 19 MG/DL (ref 7–20)
CALCIUM SERPL-MCNC: 9.4 MG/DL (ref 8.3–10.6)
CHLORIDE BLD-SCNC: 102 MMOL/L (ref 99–110)
CHOLESTEROL, TOTAL: 123 MG/DL (ref 0–199)
CO2: 25 MMOL/L (ref 21–32)
CREAT SERPL-MCNC: 1 MG/DL (ref 0.8–1.3)
EOSINOPHILS ABSOLUTE: 0.1 K/UL (ref 0–0.6)
EOSINOPHILS RELATIVE PERCENT: 1 %
GFR SERPL CREATININE-BSD FRML MDRD: >60 ML/MIN/{1.73_M2}
GLUCOSE BLD-MCNC: 99 MG/DL (ref 70–99)
HCT VFR BLD CALC: 47.8 % (ref 40.5–52.5)
HDLC SERPL-MCNC: 39 MG/DL (ref 40–60)
HEMOGLOBIN: 16 G/DL (ref 13.5–17.5)
LDL CHOLESTEROL CALCULATED: 48 MG/DL
LYMPHOCYTES ABSOLUTE: 1.7 K/UL (ref 1–5.1)
LYMPHOCYTES RELATIVE PERCENT: 21 %
MCH RBC QN AUTO: 31 PG (ref 26–34)
MCHC RBC AUTO-ENTMCNC: 33.5 G/DL (ref 31–36)
MCV RBC AUTO: 92.7 FL (ref 80–100)
MONOCYTES ABSOLUTE: 0.7 K/UL (ref 0–1.3)
MONOCYTES RELATIVE PERCENT: 9 %
NEUTROPHILS ABSOLUTE: 5.4 K/UL (ref 1.7–7.7)
NEUTROPHILS RELATIVE PERCENT: 65 %
PDW BLD-RTO: 12.9 % (ref 12.4–15.4)
PLATELET # BLD: 280 K/UL (ref 135–450)
PMV BLD AUTO: 8.2 FL (ref 5–10.5)
POTASSIUM SERPL-SCNC: 4.6 MMOL/L (ref 3.5–5.1)
RBC # BLD: 5.16 M/UL (ref 4.2–5.9)
RBC # BLD: NORMAL 10*6/UL
SODIUM BLD-SCNC: 141 MMOL/L (ref 136–145)
TOTAL PROTEIN: 7 G/DL (ref 6.4–8.2)
TRIGL SERPL-MCNC: 182 MG/DL (ref 0–150)
VITAMIN D 25-HYDROXY: 58.3 NG/ML
VLDLC SERPL CALC-MCNC: 36 MG/DL
WBC # BLD: 8 K/UL (ref 4–11)

## 2023-02-06 PROCEDURE — 99214 OFFICE O/P EST MOD 30 MIN: CPT

## 2023-02-06 PROCEDURE — 3079F DIAST BP 80-89 MM HG: CPT

## 2023-02-06 PROCEDURE — 3075F SYST BP GE 130 - 139MM HG: CPT

## 2023-02-06 RX ORDER — LOSARTAN POTASSIUM 50 MG/1
50 TABLET ORAL DAILY
COMMUNITY
Start: 2022-11-28

## 2023-02-06 ASSESSMENT — PATIENT HEALTH QUESTIONNAIRE - PHQ9
2. FEELING DOWN, DEPRESSED OR HOPELESS: 0
SUM OF ALL RESPONSES TO PHQ9 QUESTIONS 1 & 2: 0
SUM OF ALL RESPONSES TO PHQ QUESTIONS 1-9: 0
1. LITTLE INTEREST OR PLEASURE IN DOING THINGS: 0

## 2023-02-06 ASSESSMENT — ENCOUNTER SYMPTOMS
RESPIRATORY NEGATIVE: 1
GASTROINTESTINAL NEGATIVE: 1

## 2023-02-06 NOTE — RESULT ENCOUNTER NOTE
No significant worrisome finding on x-ray. No evidence of fracture. It appears an orthopedic referral was placed previously.   Please proceed with Ortho follow-up as discussed in office note

## 2023-02-17 ENCOUNTER — TELEPHONE (OUTPATIENT)
Dept: ADMINISTRATIVE | Age: 63
End: 2023-02-17

## 2023-02-22 DIAGNOSIS — R79.89 LOW VITAMIN D LEVEL: ICD-10-CM

## 2023-02-22 RX ORDER — ERGOCALCIFEROL 1.25 MG/1
CAPSULE ORAL
Qty: 12 CAPSULE | Refills: 3 | Status: SHIPPED | OUTPATIENT
Start: 2023-02-22

## 2023-02-26 SDOH — HEALTH STABILITY: PHYSICAL HEALTH: ON AVERAGE, HOW MANY MINUTES DO YOU ENGAGE IN EXERCISE AT THIS LEVEL?: 50 MIN

## 2023-02-26 SDOH — HEALTH STABILITY: PHYSICAL HEALTH: ON AVERAGE, HOW MANY DAYS PER WEEK DO YOU ENGAGE IN MODERATE TO STRENUOUS EXERCISE (LIKE A BRISK WALK)?: 2 DAYS

## 2023-02-26 ASSESSMENT — SOCIAL DETERMINANTS OF HEALTH (SDOH)

## 2023-02-28 ENCOUNTER — OFFICE VISIT (OUTPATIENT)
Dept: ORTHOPEDIC SURGERY | Age: 63
End: 2023-02-28

## 2023-02-28 VITALS — HEIGHT: 68 IN | BODY MASS INDEX: 29.1 KG/M2 | WEIGHT: 192 LBS

## 2023-02-28 DIAGNOSIS — M25.562 ACUTE PAIN OF LEFT KNEE: Primary | ICD-10-CM

## 2023-02-28 DIAGNOSIS — M77.02 MEDIAL EPICONDYLITIS OF LEFT ELBOW: ICD-10-CM

## 2023-02-28 NOTE — PROGRESS NOTES
Date:  2023    Name:  Yanique Grey  Address:  [address] [place] [address]    :  1960      Age:   60 y.o.    SSN:  [id number]      Medical Record Number:  [id number]    Reason for Visit:    Chief Complaint    Knee Pain (Left knee pain due to a fall)      DOS:2023     HPI: Zina Slade is a 60 y.o. male here today for new patient evaluation regarding his left elbow and his left knee. The patient is left-hand dominant. He was referred to me by Dr. Hubert Kat office. The patient reports that on 2022 he fell and landed on his left knee. He had a small break in the skin and some bleeding. He noticed a small bump starting to form under the skin over the knee. He does have a little bit of discomfort when he twists his knee. He denies any locking or instability to the knee. The patient also reports some ongoing left elbow pain for quite some time. He was given an elbow strap by his PCP to treat golfers elbow. He denies injury that he can recall. Pain Assessment  Location of Pain: Knee  Location Modifiers: Left  Severity of Pain: 4  Quality of Pain: Sharp  Duration of Pain: A few minutes  Frequency of Pain: Intermittent  Aggravating Factors: Other (Comment) (Twisting)  Relieving Factors: Rest  Result of Injury: Yes  ROS: All systems reviewed on patient intake form. Pertinent items are noted in HPI.         Past Medical History:   Diagnosis Date    A-fib [facility])     for short time after first aortic valve replacement    Aortic regurgitation     Aortic valve replaced     Asthma     Hyperlipidemia     Hypertension     Kidney stone     Murmur     Sleep apnea     uses CPAP        Past Surgical History:   Procedure Laterality Date    ANTERIOR CRUCIATE LIGAMENT REPAIR Right     AORTIC VALVE REPLACEMENT  2014, 2019    X 2    COLONOSCOPY N/A 2022    COLONOSCOPY POLYPECTOMY SNARE/COLD BIOPSY performed by Sushila Walsh MD at [facility] REMOVAL      NASAL SEPTUM SURGERY      POLYPECTOMY  2011    colon polyp    SHOULDER SURGERY Right     UPPER GASTROINTESTINAL ENDOSCOPY N/A 6/8/2022    EGD BIOPSY performed by Jami Holbrook MD at [address] [place] [address]       Family History   Problem Relation Age of Onset    Cancer Mother         breast    Heart Failure Father     Hearing Loss Father        Social History     Socioeconomic History    Marital status:      Spouse name: None    Number of children: None    Years of education: None    Highest education level: None   Tobacco Use    Smoking status: Never    Smokeless tobacco: Never   Substance and Sexual Activity    Alcohol use: Yes     Comment: 2-3 drinks per month    Drug use: No     Social Determinants of Health     Physical Activity: Insufficiently Active    Days of Exercise per Week: 2 days    Minutes of Exercise per Session: 50 min   Intimate Partner Violence: Not At Risk    Fear of Current or Ex-Partner: No    Emotionally Abused: No    Physically Abused: No    Sexually Abused: No       Current Outpatient Medications   Medication Sig Dispense Refill    vitamin D (ERGOCALCIFEROL) 1.25 MG (38957 UT) CAPS capsule Take 1 capsule by mouth once a week 12 capsule 3    losartan (COZAAR) 50 MG tablet Take 50 mg by mouth daily      diclofenac sodium (VOLTAREN) 1 % GEL Apply 2 g topically 4 times daily 350 g 0    pantoprazole (PROTONIX) 20 MG tablet Take 1 tablet by mouth daily 30 tablet 11    montelukast (SINGULAIR) 10 MG tablet TAKE 1 TABLET BY MOUTH EVERY DAY 30 tablet 11    fluticasone (FLOVENT HFA) 220 MCG/ACT inhaler INHALE 1 PUFF INTO THE LUNGS DAILY AS NEEDED (Patient taking differently: Inhale 1 puff into the lungs 2 times daily INHALE 1 PUFF INTO THE LUNGS DAILY AS N) 36 g 3    Evolocumab (REPATHA) 140 MG/ML SOSY Inject into the skin every 14 days       albuterol sulfate HFA (PROVENTIL;VENTOLIN;PROAIR) 108 (90 Base) MCG/ACT inhaler Inhale 2 puffs into the lungs every 6 hours as needed for Wheezing aspirin 81 MG chewable tablet Take 81 mg by mouth daily. No current facility-administered medications for this visit. Allergies   Allergen Reactions    Sulfa Antibiotics Anaphylaxis    Amlodipine Other (See Comments)     headache    Atorvastatin Other (See Comments)     Severe muscle weakness and muscular pain (when on in the past per pt 7/24/19)    Bisoprolol Other (See Comments)     Extreme fatigue and bradycardia per pt    Ciprofloxacin Other (See Comments)     torn ligament     Meloxicam Other (See Comments)     \" felt like he had the flu\"    Metoprolol Other (See Comments)     fatigue       Vital signs:  Ht 5' 8\" (1.727 m)   Wt 192 lb (87.1 kg)   BMI 29.19 kg/m²        Left knee exam    Gait: No use of assistive devices. No antalgic gait. Alignment: normal alignment. Inspection/skin: Skin is intact without erythema or ecchymosis. No gross deformity. Healed scar over the anterior medial aspect of the patella. Small area of what feels like necrotic fat underneath the skin but no signs of skin breakdown. Palpation: mild crepitus. no joint line tenderness present. Range of Motion: There is full range of motion. Strength: Normal quadriceps development. Effusion: No effusion or swelling present. Ligamentous stability: No cruciate or collateral ligament instability. Neurologic and vascular: Skin is warm and well-perfused. Sensation is intact to light-touch. Special tests: Negative Jayden sign. Left elbow examination    Tender to palpation over the medial epicondyle and flexor mass. Pain with resisted finger and wrist flexion. No pain over the lateral epicondyle or with resisted extension. Stable to varus valgus stress. Full range of motion noted. No grinding or crepitation noted      Diagnostics:  Radiology:       2 views of the left knee taken in the office today demonstrate no acute osseous abnormalities with well-maintained joint spaces.   Prior x-rays reviewed by myself in the office today demonstrate similar findings with well-preserved joint spaces to all 3 compartments. Assessment: 60 y.o. male with left knee contusion with mild osteoarthritis flare, left golfers elbow    Plan: Pertinent imaging was reviewed. The etiology, natural history, and treatment options for the disorder were discussed. The roles of activity medication, antiinflammatories, injections, bracing, physical therapy, and surgical interventions were all described to the patient and questions were answered. For the knee we went over anti-inflammatories and Tylenol to take over-the-counter to help fully alleviate his symptoms. His knee structure looks good. If he continues to bother him in a few months we can reevaluate. For the left elbow I will give him a home program for golfers elbow stretches and exercises. Continue with the strap. He can follow-up as needed if he continues to have difficulty. Nisha Parsons is in agreement with this plan. All questions were answered to patient's satisfaction and was encouraged to call with any further questions. The patient was advised that NSAID-type medications have several potential side effects that include: gastrointestinal irritation including hemorrhage, renal injury, as well as an increased risk for heart attack and stroke. The patient was asked to take the medication with food and to stop if there is any symptoms of GI upset, including heartburn, nausea, increased gas or diarrhea. I asked the patient to contact their medical provider for vomiting, abdominal pain or black/bloody stools. The patient should have renal function testing per his medical provider periodically if the medication is taken on a regular basis. The patient should be alert for any swelling in the lower extremities and should stop taking the medication immediately and contact their medical provider should this occur.   In addition, the patient should stop taking the medication immediately and contact their medical provider should there be any shortness of breath, fatigue and be evaluated in an emergency facility for any chest pain. The patient expresses understanding of these issues and questions were answered.     Idris Scruggs DO  Orthopedic Surgery and Sports Medicine  2/28/2023    Orders Placed This Encounter   Procedures    XR KNEE LEFT (1-2 VIEWS)     Standing Status:   Future     Number of Occurrences:   1     Standing Expiration Date:   2/28/2024

## 2023-05-22 DIAGNOSIS — K21.9 GASTROESOPHAGEAL REFLUX DISEASE, UNSPECIFIED WHETHER ESOPHAGITIS PRESENT: ICD-10-CM

## 2023-05-22 RX ORDER — PANTOPRAZOLE SODIUM 20 MG/1
TABLET, DELAYED RELEASE ORAL
Qty: 90 TABLET | Refills: 3 | Status: SHIPPED | OUTPATIENT
Start: 2023-05-22 | End: 2023-05-23 | Stop reason: DRUGHIGH

## 2023-05-23 ENCOUNTER — OFFICE VISIT (OUTPATIENT)
Dept: FAMILY MEDICINE CLINIC | Age: 63
End: 2023-05-23
Payer: COMMERCIAL

## 2023-05-23 VITALS
HEART RATE: 80 BPM | DIASTOLIC BLOOD PRESSURE: 84 MMHG | WEIGHT: 194 LBS | OXYGEN SATURATION: 97 % | BODY MASS INDEX: 29.5 KG/M2 | SYSTOLIC BLOOD PRESSURE: 136 MMHG

## 2023-05-23 DIAGNOSIS — R10.32 LEFT LOWER QUADRANT ABDOMINAL PAIN: Primary | ICD-10-CM

## 2023-05-23 DIAGNOSIS — K21.9 GASTROESOPHAGEAL REFLUX DISEASE, UNSPECIFIED WHETHER ESOPHAGITIS PRESENT: ICD-10-CM

## 2023-05-23 PROCEDURE — 3079F DIAST BP 80-89 MM HG: CPT

## 2023-05-23 PROCEDURE — 3075F SYST BP GE 130 - 139MM HG: CPT

## 2023-05-23 PROCEDURE — 81002 URINALYSIS NONAUTO W/O SCOPE: CPT

## 2023-05-23 PROCEDURE — 99214 OFFICE O/P EST MOD 30 MIN: CPT

## 2023-05-23 RX ORDER — VIT C/B6/B5/MAGNESIUM/HERB 173 50-5-6-5MG
CAPSULE ORAL DAILY
COMMUNITY

## 2023-05-23 RX ORDER — PANTOPRAZOLE SODIUM 20 MG/1
20 TABLET, DELAYED RELEASE ORAL 2 TIMES DAILY
Qty: 60 TABLET | Refills: 3 | Status: SHIPPED | OUTPATIENT
Start: 2023-05-23

## 2023-05-23 ASSESSMENT — ENCOUNTER SYMPTOMS
RESPIRATORY NEGATIVE: 1
DIARRHEA: 1
NAUSEA: 1
CONSTIPATION: 0
ABDOMINAL PAIN: 1
RECTAL PAIN: 0
ABDOMINAL DISTENTION: 0
VOMITING: 0
BLOOD IN STOOL: 0
ANAL BLEEDING: 0

## 2023-05-23 NOTE — PROGRESS NOTES
Víctor Darden MD   montelukast (SINGULAIR) 10 MG tablet TAKE 1 TABLET BY MOUTH EVERY DAY  LEIGHTON Bledsoe CNP   fluticasone (FLOVENT HFA) 220 MCG/ACT inhaler INHALE 1 PUFF INTO THE LUNGS DAILY AS NEEDED  Patient taking differently: Inhale 1 puff into the lungs 2 times daily INHALE 1 PUFF INTO THE LUNGS DAILY AS N  LEIGHTON Bledsoe CNP   Evolocumab (REPATHA) 140 MG/ML SOSY Inject into the skin every 14 days   Historical Provider, MD   albuterol sulfate HFA (PROVENTIL;VENTOLIN;PROAIR) 108 (90 Base) MCG/ACT inhaler Inhale 2 puffs into the lungs every 6 hours as needed for Wheezing  Historical Provider, MD   aspirin 81 MG chewable tablet Take 81 mg by mouth daily. Historical Provider, MD       Allergies   Allergen Reactions    Sulfa Antibiotics Anaphylaxis    Amlodipine Other (See Comments)     headache    Atorvastatin Other (See Comments)     Severe muscle weakness and muscular pain (when on in the past per pt 7/24/19)    Bisoprolol Other (See Comments)     Extreme fatigue and bradycardia per pt    Ciprofloxacin Other (See Comments)     torn ligament     Meloxicam Other (See Comments)     \" felt like he had the flu\"    Metoprolol Other (See Comments)     fatigue       OBJECTIVE:    /84   Pulse 80   Wt 194 lb (88 kg)   SpO2 97%   BMI 29.50 kg/m²     BP Readings from Last 2 Encounters:   05/23/23 136/84   02/06/23 132/80       Wt Readings from Last 3 Encounters:   05/23/23 194 lb (88 kg)   02/28/23 192 lb (87.1 kg)   02/06/23 192 lb 12.8 oz (87.5 kg)       Physical Exam  Constitutional:       Appearance: Normal appearance. Cardiovascular:      Rate and Rhythm: Normal rate and regular rhythm. Pulses: Normal pulses. Heart sounds: Normal heart sounds. No murmur heard. Pulmonary:      Breath sounds: Normal breath sounds. No wheezing. Abdominal:      General: Bowel sounds are normal.      Palpations: There is no mass. Tenderness: There is abdominal tenderness.  There is no

## 2023-05-24 LAB
ALBUMIN SERPL-MCNC: 4.6 G/DL (ref 3.4–5)
ALBUMIN/GLOB SERPL: 1.8 {RATIO} (ref 1.1–2.2)
ALP SERPL-CCNC: 76 U/L (ref 40–129)
ALT SERPL-CCNC: 31 U/L (ref 10–40)
ANION GAP SERPL CALCULATED.3IONS-SCNC: 10 MMOL/L (ref 3–16)
AST SERPL-CCNC: 25 U/L (ref 15–37)
BASOPHILS # BLD: 0.1 K/UL (ref 0–0.2)
BASOPHILS NFR BLD: 1.4 %
BILIRUB SERPL-MCNC: 0.3 MG/DL (ref 0–1)
BUN SERPL-MCNC: 15 MG/DL (ref 7–20)
CALCIUM SERPL-MCNC: 9.3 MG/DL (ref 8.3–10.6)
CHLORIDE SERPL-SCNC: 103 MMOL/L (ref 99–110)
CO2 SERPL-SCNC: 26 MMOL/L (ref 21–32)
CREAT SERPL-MCNC: 1 MG/DL (ref 0.8–1.3)
DEPRECATED RDW RBC AUTO: 12.7 % (ref 12.4–15.4)
EOSINOPHIL # BLD: 0.3 K/UL (ref 0–0.6)
EOSINOPHIL NFR BLD: 3.2 %
GFR SERPLBLD CREATININE-BSD FMLA CKD-EPI: >60 ML/MIN/{1.73_M2}
GLUCOSE SERPL-MCNC: 89 MG/DL (ref 70–99)
HCT VFR BLD AUTO: 48.6 % (ref 40.5–52.5)
HGB BLD-MCNC: 16.5 G/DL (ref 13.5–17.5)
LIPASE SERPL-CCNC: 37 U/L (ref 13–60)
LYMPHOCYTES # BLD: 2 K/UL (ref 1–5.1)
LYMPHOCYTES NFR BLD: 23.3 %
MCH RBC QN AUTO: 31.2 PG (ref 26–34)
MCHC RBC AUTO-ENTMCNC: 33.9 G/DL (ref 31–36)
MCV RBC AUTO: 92.1 FL (ref 80–100)
MONOCYTES # BLD: 0.8 K/UL (ref 0–1.3)
MONOCYTES NFR BLD: 9.8 %
NEUTROPHILS # BLD: 5.4 K/UL (ref 1.7–7.7)
NEUTROPHILS NFR BLD: 62.3 %
PLATELET # BLD AUTO: 270 K/UL (ref 135–450)
PMV BLD AUTO: 8.9 FL (ref 5–10.5)
POTASSIUM SERPL-SCNC: 4 MMOL/L (ref 3.5–5.1)
PROT SERPL-MCNC: 7.1 G/DL (ref 6.4–8.2)
RBC # BLD AUTO: 5.28 M/UL (ref 4.2–5.9)
SODIUM SERPL-SCNC: 139 MMOL/L (ref 136–145)
WBC # BLD AUTO: 8.7 K/UL (ref 4–11)

## 2023-06-07 NOTE — TELEPHONE ENCOUNTER
From: Milton Lopez  To: LEIGHTON Luke - CNP  Sent: 5/3/2021 9:19 AM EDT  Subject: Prescription Question    Hello,   I need refills on my Singulair, I use [facility].     Thanks,   Benson Morales Mirvaso Pregnancy And Lactation Text: This medication has not been assigned a Pregnancy Risk Category. It is unknown if the medication is excreted in breast milk.

## 2023-07-17 DIAGNOSIS — J45.909 UNCOMPLICATED ASTHMA, UNSPECIFIED ASTHMA SEVERITY, UNSPECIFIED WHETHER PERSISTENT: ICD-10-CM

## 2023-07-17 RX ORDER — FLUTICASONE PROPIONATE 220 UG/1
AEROSOL, METERED RESPIRATORY (INHALATION)
Qty: 36 G | Refills: 4 | Status: SHIPPED | OUTPATIENT
Start: 2023-07-17

## 2023-08-07 ENCOUNTER — OFFICE VISIT (OUTPATIENT)
Dept: FAMILY MEDICINE CLINIC | Age: 63
End: 2023-08-07
Payer: COMMERCIAL

## 2023-08-07 ENCOUNTER — TELEPHONE (OUTPATIENT)
Dept: FAMILY MEDICINE CLINIC | Age: 63
End: 2023-08-07

## 2023-08-07 VITALS
BODY MASS INDEX: 29.8 KG/M2 | SYSTOLIC BLOOD PRESSURE: 136 MMHG | DIASTOLIC BLOOD PRESSURE: 80 MMHG | HEART RATE: 75 BPM | OXYGEN SATURATION: 97 % | WEIGHT: 196 LBS

## 2023-08-07 DIAGNOSIS — J45.40 MODERATE PERSISTENT ASTHMA WITHOUT COMPLICATION: ICD-10-CM

## 2023-08-07 DIAGNOSIS — I48.91 ATRIAL FIBRILLATION, UNSPECIFIED TYPE (HCC): ICD-10-CM

## 2023-08-07 DIAGNOSIS — R29.898 BILATERAL LEG WEAKNESS: ICD-10-CM

## 2023-08-07 DIAGNOSIS — M54.42 CHRONIC MIDLINE LOW BACK PAIN WITH BILATERAL SCIATICA: ICD-10-CM

## 2023-08-07 DIAGNOSIS — I10 ESSENTIAL HYPERTENSION: Primary | ICD-10-CM

## 2023-08-07 DIAGNOSIS — M54.41 CHRONIC MIDLINE LOW BACK PAIN WITH BILATERAL SCIATICA: ICD-10-CM

## 2023-08-07 DIAGNOSIS — E78.5 HYPERLIPIDEMIA, UNSPECIFIED HYPERLIPIDEMIA TYPE: ICD-10-CM

## 2023-08-07 DIAGNOSIS — I25.10 CORONARY ARTERY DISEASE INVOLVING NATIVE CORONARY ARTERY OF NATIVE HEART WITHOUT ANGINA PECTORIS: ICD-10-CM

## 2023-08-07 DIAGNOSIS — E55.9 VITAMIN D DEFICIENCY: ICD-10-CM

## 2023-08-07 DIAGNOSIS — G89.29 CHRONIC MIDLINE LOW BACK PAIN WITH BILATERAL SCIATICA: ICD-10-CM

## 2023-08-07 DIAGNOSIS — J45.909 UNCOMPLICATED ASTHMA, UNSPECIFIED ASTHMA SEVERITY, UNSPECIFIED WHETHER PERSISTENT: ICD-10-CM

## 2023-08-07 LAB
25(OH)D3 SERPL-MCNC: 64.6 NG/ML
CHOLEST SERPL-MCNC: 121 MG/DL (ref 0–199)
HDLC SERPL-MCNC: 39 MG/DL (ref 40–60)
LDLC SERPL CALC-MCNC: 50 MG/DL
TRIGL SERPL-MCNC: 159 MG/DL (ref 0–150)
VLDLC SERPL CALC-MCNC: 32 MG/DL

## 2023-08-07 PROCEDURE — 3075F SYST BP GE 130 - 139MM HG: CPT

## 2023-08-07 PROCEDURE — 3079F DIAST BP 80-89 MM HG: CPT

## 2023-08-07 PROCEDURE — 99215 OFFICE O/P EST HI 40 MIN: CPT

## 2023-08-07 RX ORDER — METHYLPREDNISOLONE 4 MG/1
TABLET ORAL
Qty: 1 KIT | Refills: 0 | Status: SHIPPED | OUTPATIENT
Start: 2023-08-07 | End: 2023-08-13

## 2023-08-07 RX ORDER — FLUTICASONE PROPIONATE 220 UG/1
AEROSOL, METERED RESPIRATORY (INHALATION)
Qty: 36 G | Refills: 4 | Status: SHIPPED | OUTPATIENT
Start: 2023-08-07

## 2023-08-07 RX ORDER — FLUTICASONE FUROATE AND VILANTEROL 200; 25 UG/1; UG/1
1 POWDER RESPIRATORY (INHALATION) DAILY
Qty: 28 EACH | Refills: 3 | Status: SHIPPED | OUTPATIENT
Start: 2023-08-07 | End: 2023-08-07 | Stop reason: SDUPTHER

## 2023-08-07 RX ORDER — FLUTICASONE FUROATE AND VILANTEROL 200; 25 UG/1; UG/1
1 POWDER RESPIRATORY (INHALATION) DAILY
Qty: 28 EACH | Refills: 3 | Status: SHIPPED | OUTPATIENT
Start: 2023-08-07

## 2023-08-07 ASSESSMENT — ENCOUNTER SYMPTOMS
GASTROINTESTINAL NEGATIVE: 1
BACK PAIN: 1
SHORTNESS OF BREATH: 1
WHEEZING: 1

## 2023-08-07 NOTE — TELEPHONE ENCOUNTER
I sent new prescriptions for both medications and put a note on the prescription that was okay to fill with generic if better covered under patient's insurance plan.

## 2023-08-07 NOTE — PROGRESS NOTES
Chief Complaint   Patient presents with    Hypertension    Hyperlipidemia       HPI:  Lakisha Franco is a 60 y.o. (: 1960) here today   for for routine office visit. Hypertension, A-fib, CAD: /80 today. Taking losartan 50 mg daily, aspirin 81 mg daily. Follows cardiology  and [facility]. Had CMP panel drawn on 2023. Stable kidney function and electrolytes. CBC also normal at that time. In on yearly follow ups with cardio at this time. Hyperlipidemia: Taking Repatha injection every 14 days. Asthma: Feels asthma is worsening. Currently is on albuterol and Flovent. States has had more wheezing episodes and shortness of breath this time a year. Weakness in BLE: has been an issue for about 3 years on and off. Hx of old back injury falling on a log backwards. Over this time has worsened. Over last several months a big change in weakness to the point he cant work out and exercise with walking or jogging anymore. Legs feel like jello from waste down. Back hurts in the morning and evening. Denies significant low back pains throughout the day. Has a sedentary job. Cardio did US see below for symptoms but showed not acute findings. VASCI-ART DUPLEX LE COMP HENRI    Anatomical Region Laterality Modality   Leg -- Intravascular Ultrasound     Narrative    FINAL IMPRESSIONS   Conclusions:  Duplex scan reveals no evidence of significant arterial occlusive    disease in the bilateral lower extremities. Ankle/brachial index is within normal limits in the bilateral lower    extremities. Full report view including tables and additional findings is available in the link below  Procedure Note    Cleopatra Metcalf MD - 2023   Formatting of this note might be different from the original.   FINAL IMPRESSIONS   Conclusions:  Duplex scan reveals no evidence of significant arterial occlusive    disease in the bilateral lower extremities.     Ankle/brachial index is within normal

## 2023-08-07 NOTE — TELEPHONE ENCOUNTER
Pt called and asked if the [facility] and the [employer] scripts could say generic is ok so the pharmacy will give him the generic meds?

## 2024-01-08 RX ORDER — LOSARTAN POTASSIUM 50 MG/1
50 TABLET ORAL DAILY
Qty: 90 TABLET | Refills: 3 | Status: SHIPPED | OUTPATIENT
Start: 2024-01-08

## 2024-03-08 DIAGNOSIS — R79.89 LOW VITAMIN D LEVEL: ICD-10-CM

## 2024-03-08 RX ORDER — ERGOCALCIFEROL 1.25 MG/1
CAPSULE ORAL
Qty: 12 CAPSULE | Refills: 3 | Status: SHIPPED | OUTPATIENT
Start: 2024-03-08

## (undated) DEVICE — SNARE ENDOSCP L240CM SHTH DIA24MM LOOP W10MM POLYP RND REINF

## (undated) DEVICE — FORCEPS BX L240CM JAW DIA2.8MM L CAP W/ NDL MIC MESH TOOTH

## (undated) DEVICE — ENDOSCOPIC KIT 2 12 FT OP4 DE2 GWN SYR

## (undated) DEVICE — AIRLIFE™ NASAL OXYGEN CANNULA CURVED, FLARED TIP, WITH 7 FEET (2.1 M) CRUSH RESISTANT TUBING, OVER-THE-EAR STYLE: Brand: AIRLIFE™

## (undated) DEVICE — CONMED SCOPE SAVER BITE BLOCK, 20X27 MM: Brand: SCOPE SAVER

## (undated) DEVICE — ELECTRODE ECG MONITR FOAM TEAR DROP ADLT RED

## (undated) DEVICE — TRAP SPEC POLYPR SGL CHMBR FN MESH SCRN